# Patient Record
Sex: MALE | Race: BLACK OR AFRICAN AMERICAN | NOT HISPANIC OR LATINO | Employment: FULL TIME | URBAN - METROPOLITAN AREA
[De-identification: names, ages, dates, MRNs, and addresses within clinical notes are randomized per-mention and may not be internally consistent; named-entity substitution may affect disease eponyms.]

---

## 2017-09-11 ENCOUNTER — OFFICE VISIT (OUTPATIENT)
Dept: PODIATRY | Facility: CLINIC | Age: 42
End: 2017-09-11
Payer: COMMERCIAL

## 2017-09-11 ENCOUNTER — HOSPITAL ENCOUNTER (OUTPATIENT)
Dept: RADIOLOGY | Facility: HOSPITAL | Age: 42
Discharge: HOME | End: 2017-09-11
Attending: PODIATRIST
Payer: COMMERCIAL

## 2017-09-11 VITALS
DIASTOLIC BLOOD PRESSURE: 115 MMHG | HEART RATE: 87 BPM | SYSTOLIC BLOOD PRESSURE: 177 MMHG | WEIGHT: 237 LBS | HEIGHT: 74 IN | BODY MASS INDEX: 30.42 KG/M2

## 2017-09-11 DIAGNOSIS — M79.672 PAIN IN BOTH FEET: ICD-10-CM

## 2017-09-11 DIAGNOSIS — M79.672 PAIN IN BOTH FEET: Primary | ICD-10-CM

## 2017-09-11 DIAGNOSIS — M79.671 PAIN IN BOTH FEET: Primary | ICD-10-CM

## 2017-09-11 DIAGNOSIS — M79.671 PAIN IN BOTH FEET: ICD-10-CM

## 2017-09-11 DIAGNOSIS — M19.90 ARTHRITIS: ICD-10-CM

## 2017-09-11 PROCEDURE — 3008F BODY MASS INDEX DOCD: CPT | Mod: S$GLB,,, | Performed by: PODIATRIST

## 2017-09-11 PROCEDURE — 73610 X-RAY EXAM OF ANKLE: CPT | Mod: 50,TC

## 2017-09-11 PROCEDURE — 73630 X-RAY EXAM OF FOOT: CPT | Mod: 50,TC

## 2017-09-11 PROCEDURE — 73630 X-RAY EXAM OF FOOT: CPT | Mod: 26,50,, | Performed by: RADIOLOGY

## 2017-09-11 PROCEDURE — 99204 OFFICE O/P NEW MOD 45 MIN: CPT | Mod: S$GLB,,, | Performed by: PODIATRIST

## 2017-09-11 PROCEDURE — 73610 X-RAY EXAM OF ANKLE: CPT | Mod: 26,50,, | Performed by: RADIOLOGY

## 2017-09-11 PROCEDURE — 99999 PR PBB SHADOW E&M-EST. PATIENT-LVL III: CPT | Mod: PBBFAC,,, | Performed by: PODIATRIST

## 2017-09-11 RX ORDER — CELECOXIB 100 MG/1
100 CAPSULE ORAL 2 TIMES DAILY
Qty: 40 CAPSULE | Refills: 0 | Status: SHIPPED | OUTPATIENT
Start: 2017-09-11 | End: 2017-10-06 | Stop reason: SDUPTHER

## 2017-09-11 NOTE — PROGRESS NOTES
"CC:   bilateral rearfoot pain      HPI:   Ned Mcdermott is a 42 y.o. 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  Pain is worse with increased activity and weight bearing, localized to the rearfoot.  He reports a history of injury prior and was in a corrective boot, which seemed to change his gait and made pain worse.   He has tried soaks, frozen water bottle, ibuprofen at home for treatment.   On the weekends when he is not working, there is no pain.  He has been wearing a soft mesh athletic type shoe for the past month.  The work boots he wore prior were too painful to wear.   He has seen an outside podiatrist a few years ago.         No past medical history on file.      No current outpatient prescriptions on file prior to visit.     No current facility-administered medications on file prior to visit.            Review of patient's allergies indicates:  No Known Allergies        ROS:   General ROS: negative for - chills, fever or night sweats  Respiratory ROS: no cough, shortness of breath, or wheezing  Cardiovascular ROS: no chest pain or dyspnea on exertion  Musculoskeletal ROS: negative  Neurological ROS: no TIA or stroke symptoms  Dermatological ROS: negative      EXAM:     Vitals:    09/11/17 1437   BP: (!) 177/115   Pulse: 87   Weight: 107.5 kg (237 lb)   Height: 6' 2" (1.88 m)        General:  Alert, oriented, no acute distress      Bilateral  Lower extremity exam:    Vascular:   Dorsalis Pedis:  present   Posterior Tibial:  present  capillary refill time:  3 seconds  Temperature of toes warm to touch  Hair on toes:  present    Trace and non-pitting Edema to affected area.        Neurological:     sharp dull - B/L normal and light touch - B/L normal  No sensorimotor deficits      Dermatological:   There is intacg skin tone, turgor, and temperature.    Wounds: none  Erythema:  " none      Musculoskeletal:   Metatarsophalangeal, subtalar, and ankle range of motion are adequate ROM, adequate strength  Right foot: no gross deformity  Left foot: no gross deformity  Pain not reproducible        Imagin17 Bilateral feet 3 views.  There are hypertrophic degenerative change at the talonavicular joint bilaterally.  Calcaneal spurs noted bilaterally as well no acute fractures.  Impression degenerative change advanced for age as above..  Ankle 3 views bilateral   Mild DJD.  No fracture or dislocation.  No bone destruction identified            ASSESSMENT/PLAN:        I counseled the patient on his conditions, their implications and medical management.        Pain in both feet  -     X-Ray Ankle Complete Bilateral; Future; Expected date: 2017  -     X-Ray Foot Complete Bilateral; Future; Expected date: 2017    Arthritis    Other orders  -     celecoxib (CELEBREX) 100 MG capsule; Take 1 capsule (100 mg total) by mouth 2 (two) times daily.  Dispense: 40 capsule; Refill: 0    Xrays reviewed with the patient.   RICE.  Consider activity modification.  Call or return to clinic prn if these symptoms worsen or fail to improve as anticipated.

## 2017-09-28 ENCOUNTER — OFFICE VISIT (OUTPATIENT)
Dept: INTERNAL MEDICINE | Facility: CLINIC | Age: 42
End: 2017-09-28
Payer: COMMERCIAL

## 2017-09-28 VITALS
HEIGHT: 74 IN | HEART RATE: 84 BPM | SYSTOLIC BLOOD PRESSURE: 142 MMHG | BODY MASS INDEX: 30.53 KG/M2 | WEIGHT: 237.88 LBS | DIASTOLIC BLOOD PRESSURE: 102 MMHG | TEMPERATURE: 98 F | RESPIRATION RATE: 16 BRPM

## 2017-09-28 DIAGNOSIS — J30.89 CHRONIC NONSEASONAL ALLERGIC RHINITIS DUE TO OTHER ALLERGEN: ICD-10-CM

## 2017-09-28 DIAGNOSIS — G89.29 CHRONIC BILATERAL LOW BACK PAIN WITHOUT SCIATICA: ICD-10-CM

## 2017-09-28 DIAGNOSIS — M54.50 CHRONIC BILATERAL LOW BACK PAIN WITHOUT SCIATICA: ICD-10-CM

## 2017-09-28 DIAGNOSIS — K21.9 GASTROESOPHAGEAL REFLUX DISEASE WITHOUT ESOPHAGITIS: ICD-10-CM

## 2017-09-28 DIAGNOSIS — I10 ESSENTIAL HYPERTENSION: ICD-10-CM

## 2017-09-28 DIAGNOSIS — F17.200 TOBACCO DEPENDENCE: ICD-10-CM

## 2017-09-28 DIAGNOSIS — Z00.00 ANNUAL PHYSICAL EXAM: Primary | ICD-10-CM

## 2017-09-28 PROCEDURE — 99999 PR PBB SHADOW E&M-EST. PATIENT-LVL IV: CPT | Mod: PBBFAC,,, | Performed by: INTERNAL MEDICINE

## 2017-09-28 PROCEDURE — 99386 PREV VISIT NEW AGE 40-64: CPT | Mod: 25,S$GLB,, | Performed by: INTERNAL MEDICINE

## 2017-09-28 PROCEDURE — 90732 PPSV23 VACC 2 YRS+ SUBQ/IM: CPT | Mod: S$GLB,,, | Performed by: INTERNAL MEDICINE

## 2017-09-28 PROCEDURE — 90471 IMMUNIZATION ADMIN: CPT | Mod: S$GLB,,, | Performed by: INTERNAL MEDICINE

## 2017-09-28 RX ORDER — LOSARTAN POTASSIUM AND HYDROCHLOROTHIAZIDE 12.5; 1 MG/1; MG/1
TABLET ORAL
COMMUNITY
Start: 2017-09-26 | End: 2017-09-28 | Stop reason: CLARIF

## 2017-09-28 RX ORDER — AMLODIPINE BESYLATE 10 MG/1
10 TABLET ORAL DAILY
Qty: 30 TABLET | Refills: 11 | Status: SHIPPED | OUTPATIENT
Start: 2017-09-28 | End: 2017-11-09 | Stop reason: SDUPTHER

## 2017-09-28 RX ORDER — PANTOPRAZOLE SODIUM 40 MG/1
40 TABLET, DELAYED RELEASE ORAL
Qty: 30 TABLET | Refills: 11 | Status: SHIPPED | OUTPATIENT
Start: 2017-09-28 | End: 2017-11-09 | Stop reason: SDUPTHER
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
## 2017-10-06 RX ORDER — CELECOXIB 100 MG/1
100 CAPSULE ORAL 2 TIMES DAILY
Qty: 40 CAPSULE | Refills: 0 | Status: SHIPPED | OUTPATIENT
Start: 2017-10-06 | End: 2017-10-22 | Stop reason: SDUPTHER

## 2017-10-07 ENCOUNTER — LAB VISIT (OUTPATIENT)
Dept: LAB | Facility: HOSPITAL | Age: 42
End: 2017-10-07
Attending: INTERNAL MEDICINE
Payer: COMMERCIAL

## 2017-10-07 DIAGNOSIS — Z00.00 ANNUAL PHYSICAL EXAM: ICD-10-CM

## 2017-10-07 LAB
ALBUMIN SERPL BCP-MCNC: 3.7 G/DL
ALP SERPL-CCNC: 69 U/L
ALT SERPL W/O P-5'-P-CCNC: 59 U/L
ANION GAP SERPL CALC-SCNC: 12 MMOL/L
AST SERPL-CCNC: 36 U/L
BASOPHILS # BLD AUTO: 0.05 K/UL
BASOPHILS NFR BLD: 0.7 %
BILIRUB SERPL-MCNC: 0.3 MG/DL
BUN SERPL-MCNC: 18 MG/DL
CALCIUM SERPL-MCNC: 9.9 MG/DL
CHLORIDE SERPL-SCNC: 107 MMOL/L
CO2 SERPL-SCNC: 23 MMOL/L
CREAT SERPL-MCNC: 1.3 MG/DL
DIFFERENTIAL METHOD: NORMAL
EOSINOPHIL # BLD AUTO: 0.3 K/UL
EOSINOPHIL NFR BLD: 4.2 %
ERYTHROCYTE [DISTWIDTH] IN BLOOD BY AUTOMATED COUNT: 13.6 %
EST. GFR  (AFRICAN AMERICAN): >60 ML/MIN/1.73 M^2
EST. GFR  (NON AFRICAN AMERICAN): >60 ML/MIN/1.73 M^2
ESTIMATED AVG GLUCOSE: 120 MG/DL
GLUCOSE SERPL-MCNC: 104 MG/DL
HBA1C MFR BLD HPLC: 5.8 %
HCT VFR BLD AUTO: 42.7 %
HGB BLD-MCNC: 14.5 G/DL
LYMPHOCYTES # BLD AUTO: 2.1 K/UL
LYMPHOCYTES NFR BLD: 27 %
MCH RBC QN AUTO: 30 PG
MCHC RBC AUTO-ENTMCNC: 34 G/DL
MCV RBC AUTO: 88 FL
MONOCYTES # BLD AUTO: 0.9 K/UL
MONOCYTES NFR BLD: 11.5 %
NEUTROPHILS # BLD AUTO: 4.3 K/UL
NEUTROPHILS NFR BLD: 56.6 %
PLATELET # BLD AUTO: 266 K/UL
PMV BLD AUTO: 10.5 FL
POTASSIUM SERPL-SCNC: 4.4 MMOL/L
PROT SERPL-MCNC: 7.7 G/DL
RBC # BLD AUTO: 4.83 M/UL
SODIUM SERPL-SCNC: 142 MMOL/L
TSH SERPL DL<=0.005 MIU/L-ACNC: 0.84 UIU/ML
WBC # BLD AUTO: 7.63 K/UL

## 2017-10-07 PROCEDURE — 80053 COMPREHEN METABOLIC PANEL: CPT

## 2017-10-07 PROCEDURE — 84443 ASSAY THYROID STIM HORMONE: CPT

## 2017-10-07 PROCEDURE — 85025 COMPLETE CBC W/AUTO DIFF WBC: CPT

## 2017-10-07 PROCEDURE — 83036 HEMOGLOBIN GLYCOSYLATED A1C: CPT

## 2017-10-07 PROCEDURE — 36415 COLL VENOUS BLD VENIPUNCTURE: CPT | Mod: PO

## 2017-10-09 ENCOUNTER — PATIENT MESSAGE (OUTPATIENT)
Dept: INTERNAL MEDICINE | Facility: CLINIC | Age: 42
End: 2017-10-09

## 2017-10-09 DIAGNOSIS — R73.03 PRE-DIABETES: ICD-10-CM

## 2017-10-09 DIAGNOSIS — R74.01 ELEVATED ALT MEASUREMENT: ICD-10-CM

## 2017-10-10 NOTE — TELEPHONE ENCOUNTER
Informed the patient of test results and recommendations and mailed repeat blood work for 3 months to the patient.

## 2017-10-11 ENCOUNTER — OFFICE VISIT (OUTPATIENT)
Dept: PODIATRY | Facility: CLINIC | Age: 42
End: 2017-10-11
Payer: COMMERCIAL

## 2017-10-11 VITALS
SYSTOLIC BLOOD PRESSURE: 144 MMHG | WEIGHT: 241 LBS | DIASTOLIC BLOOD PRESSURE: 103 MMHG | HEART RATE: 84 BPM | BODY MASS INDEX: 30.93 KG/M2 | HEIGHT: 74 IN

## 2017-10-11 DIAGNOSIS — M79.672 PAIN IN BOTH FEET: ICD-10-CM

## 2017-10-11 DIAGNOSIS — M19.90 ARTHRITIS: Primary | ICD-10-CM

## 2017-10-11 DIAGNOSIS — M79.671 PAIN IN BOTH FEET: ICD-10-CM

## 2017-10-11 PROCEDURE — 99213 OFFICE O/P EST LOW 20 MIN: CPT | Mod: S$GLB,,, | Performed by: PODIATRIST

## 2017-10-11 PROCEDURE — 99999 PR PBB SHADOW E&M-EST. PATIENT-LVL III: CPT | Mod: PBBFAC,,, | Performed by: PODIATRIST

## 2017-10-11 NOTE — PROGRESS NOTES
CC:   bilateral rearfoot pain      HPI:   Ned Mcdermott is a 42 y.o. 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  Pain is worse with increased activity and weight bearing, localized to the rearfoot.  He reports a history of injury prior and was in a corrective boot, which seemed to change his gait and made pain worse.   He has tried soaks, frozen water bottle, ibuprofen at home for treatment.   On the weekends when he is not working, there is no pain.  He has been wearing a soft mesh athletic type shoe for the past month.  The work boots he wore prior were too painful to wear.   He has seen an outside podiatrist a few years ago.         10/11/17:  follow up foot pain.  Reports improvement with Celebrex and insoles.  Otherwise, feet do get achy if walking a lot.  Unable to modify activities much.         Past Medical History:   Diagnosis Date    Allergy     Hypertension          Current Outpatient Prescriptions on File Prior to Visit   Medication Sig Dispense Refill    amlodipine (NORVASC) 10 MG tablet Take 1 tablet (10 mg total) by mouth once daily. 30 tablet 11    celecoxib (CELEBREX) 100 MG capsule TAKE 1 CAPSULE (100 MG TOTAL) BY MOUTH 2 (TWO) TIMES DAILY. 40 capsule 0    pantoprazole (PROTONIX) 40 MG tablet Take 1 tablet (40 mg total) by mouth before breakfast. 30 tablet 11     No current facility-administered medications on file prior to visit.            Review of patient's allergies indicates:  No Known Allergies        ROS:   General ROS: negative for - chills, fever or night sweats  Respiratory ROS: no cough, shortness of breath, or wheezing  Cardiovascular ROS: no chest pain or dyspnea on exertion  Musculoskeletal ROS: negative  Neurological ROS: no TIA or stroke symptoms  Dermatological ROS: negative      EXAM:     Vitals:    10/11/17 1554   BP: (!) 144/103   Pulse: 84  "  Weight: 109.3 kg (241 lb)   Height: 6' 2" (1.88 m)        General:  Alert, oriented, no acute distress      Bilateral  Lower extremity exam:    Vascular:   Dorsalis Pedis:  present   Posterior Tibial:  present  capillary refill time:  3 seconds  Temperature of toes warm to touch  Hair on toes:  present    No edema      Neurological:     sharp dull - B/L normal and light touch - B/L normal  No sensorimotor deficits      Dermatological:   There is intacg skin tone, turgor, and temperature.    Wounds: none  Erythema:  none      Musculoskeletal:   Metatarsophalangeal, subtalar, and ankle range of motion are adequate ROM, adequate strength  Right foot: no gross deformity  Left foot: no gross deformity  Pain not reproducible        Imagin17 Bilateral feet 3 views.  There are hypertrophic degenerative change at the talonavicular joint bilaterally.  Calcaneal spurs noted bilaterally as well no acute fractures.  Impression degenerative change advanced for age as above..  Ankle 3 views bilateral   Mild DJD.  No fracture or dislocation.  No bone destruction identified            ASSESSMENT/PLAN:        I counseled the patient on his conditions, their implications and medical management.        Arthritis    Pain in both feet    ·   RICE  · NSAIDS prn   · Consider activity modification.     · PT if no improvement  "

## 2017-10-19 ENCOUNTER — TELEPHONE (OUTPATIENT)
Dept: SMOKING CESSATION | Facility: CLINIC | Age: 42
End: 2017-10-19

## 2017-10-19 NOTE — TELEPHONE ENCOUNTER
Successful contact with patient regarding missed tobacco cessation intake appointment. Pt states, He's not ready to schedule at this time due to his work schedule. Contact information provided to schedule when ready.

## 2017-10-22 RX ORDER — CELECOXIB 100 MG/1
100 CAPSULE ORAL 2 TIMES DAILY
Qty: 40 CAPSULE | Refills: 0 | Status: SHIPPED | OUTPATIENT
Start: 2017-10-22 | End: 2017-11-03 | Stop reason: SDUPTHER

## 2017-10-26 ENCOUNTER — PATIENT MESSAGE (OUTPATIENT)
Dept: PODIATRY | Facility: CLINIC | Age: 42
End: 2017-10-26

## 2017-10-31 ENCOUNTER — OFFICE VISIT (OUTPATIENT)
Dept: PODIATRY | Facility: CLINIC | Age: 42
End: 2017-10-31
Payer: COMMERCIAL

## 2017-10-31 VITALS
HEIGHT: 74 IN | BODY MASS INDEX: 30.93 KG/M2 | HEART RATE: 80 BPM | DIASTOLIC BLOOD PRESSURE: 90 MMHG | SYSTOLIC BLOOD PRESSURE: 140 MMHG | WEIGHT: 241 LBS

## 2017-10-31 DIAGNOSIS — M19.90 ARTHRITIS: Primary | ICD-10-CM

## 2017-10-31 DIAGNOSIS — M79.671 PAIN IN BOTH FEET: ICD-10-CM

## 2017-10-31 DIAGNOSIS — M79.672 PAIN IN BOTH FEET: ICD-10-CM

## 2017-10-31 PROCEDURE — 99499 UNLISTED E&M SERVICE: CPT | Mod: S$GLB,,, | Performed by: PODIATRIST

## 2017-10-31 PROCEDURE — 20605 DRAIN/INJ JOINT/BURSA W/O US: CPT | Mod: RT,S$GLB,, | Performed by: PODIATRIST

## 2017-10-31 PROCEDURE — 99999 PR PBB SHADOW E&M-EST. PATIENT-LVL III: CPT | Mod: PBBFAC,,, | Performed by: PODIATRIST

## 2017-10-31 RX ORDER — DEXAMETHASONE SODIUM PHOSPHATE 4 MG/ML
4 INJECTION, SOLUTION INTRA-ARTICULAR; INTRALESIONAL; INTRAMUSCULAR; INTRAVENOUS; SOFT TISSUE ONCE
Status: COMPLETED | OUTPATIENT
Start: 2017-10-31 | End: 2017-10-31

## 2017-10-31 RX ORDER — TRIAMCINOLONE ACETONIDE 40 MG/ML
40 INJECTION, SUSPENSION INTRA-ARTICULAR; INTRAMUSCULAR ONCE
Status: COMPLETED | OUTPATIENT
Start: 2017-10-31 | End: 2017-10-31

## 2017-10-31 RX ADMIN — TRIAMCINOLONE ACETONIDE 40 MG: 40 INJECTION, SUSPENSION INTRA-ARTICULAR; INTRAMUSCULAR at 03:10

## 2017-10-31 RX ADMIN — DEXAMETHASONE SODIUM PHOSPHATE 4 MG: 4 INJECTION, SOLUTION INTRA-ARTICULAR; INTRALESIONAL; INTRAMUSCULAR; INTRAVENOUS; SOFT TISSUE at 03:10

## 2017-10-31 NOTE — PROGRESS NOTES
CC:   bilateral rearfoot pain      HPI:   Ned Mcdermott is a 42 y.o. 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  Pain is worse with increased activity and weight bearing, localized to the rearfoot.  He reports a history of injury prior and was in a corrective boot, which seemed to change his gait and made pain worse.   He has tried soaks, frozen water bottle, ibuprofen at home for treatment.   On the weekends when he is not working, there is no pain.  He has been wearing a soft mesh athletic type shoe for the past month.  The work boots he wore prior were too painful to wear.   He has seen an outside podiatrist a few years ago.     10/11/17:  follow up foot pain.  Reports improvement with Celebrex and insoles.  Otherwise, feet do get achy if walking a lot.  Unable to modify activities much.       10/31/17:  follow up foot pain.  Has insoles.  Pain relief also with celebrex.  He is requesting an injection today.           Past Medical History:   Diagnosis Date    Allergy     Hypertension          Current Outpatient Prescriptions on File Prior to Visit   Medication Sig Dispense Refill    amlodipine (NORVASC) 10 MG tablet Take 1 tablet (10 mg total) by mouth once daily. 30 tablet 11    celecoxib (CELEBREX) 100 MG capsule TAKE 1 CAPSULE (100 MG TOTAL) BY MOUTH 2 (TWO) TIMES DAILY. 40 capsule 0    pantoprazole (PROTONIX) 40 MG tablet Take 1 tablet (40 mg total) by mouth before breakfast. 30 tablet 11     No current facility-administered medications on file prior to visit.            Review of patient's allergies indicates:  No Known Allergies        ROS:   General ROS: negative for - chills, fever or night sweats  Respiratory ROS: no cough, shortness of breath, or wheezing  Cardiovascular ROS: no chest pain or dyspnea on exertion  Musculoskeletal ROS: negative  Neurological ROS: no TIA or  "stroke symptoms  Dermatological ROS: negative      EXAM:     Vitals:    10/31/17 1454   BP: (!) 140/90   Pulse: 80   Weight: 109.3 kg (241 lb)   Height: 6' 2" (1.88 m)        General:  Alert, oriented, no acute distress      RIGHT Lower extremity exam:    Vascular:   Dorsalis Pedis:  present   Posterior Tibial:  present  capillary refill time:  3 seconds  Temperature of toes warm to touch  Hair on toes:  present    Trace edema to dorsal foot.       Neurological:     sharp dull - B/L normal and light touch - B/L normal  No sensorimotor deficits      Dermatological:   There is intacg skin tone, turgor, and temperature.    Wounds: none  Erythema:  none      Musculoskeletal:   Metatarsophalangeal, subtalar, and ankle range of motion are adequate ROM, adequate strength  Right foot: no gross deformity  Left foot: no gross deformity  Pain not reproducible        Imagin17 Bilateral feet 3 views.  There are hypertrophic degenerative change at the talonavicular joint bilaterally.  Calcaneal spurs noted bilaterally as well no acute fractures.  Impression degenerative change advanced for age as above..  Ankle 3 views bilateral   Mild DJD.  No fracture or dislocation.  No bone destruction identified            ASSESSMENT/PLAN:        I counseled the patient on his conditions, their implications and medical management.      Arthritis - Right Foot  -     dexamethasone injection 4 mg; Inject 1 mL (4 mg total) into the muscle once.  -     triamcinolone acetonide injection 40 mg; Inject 1 mL (40 mg total) into the muscle once.    Pain in both feet  -     dexamethasone injection 4 mg; Inject 1 mL (4 mg total) into the muscle once.  -     triamcinolone acetonide injection 40 mg; Inject 1 mL (40 mg total) into the muscle once.          · RICE  · NSAIDS prn   (Celebrex, he got a refill already)  · Consider activity modification.     · PT if no improvement  · With patient's permission,  A steroid injection was performed at right " midfoot using 1% plain Lidocaine and 4 mg of decadron and Kenalog-40. This was well tolerated.    Call or return to clinic prn if these symptoms worsen or fail to improve as anticipated.

## 2017-11-05 RX ORDER — CELECOXIB 100 MG/1
100 CAPSULE ORAL 2 TIMES DAILY
Qty: 40 CAPSULE | Refills: 0 | Status: SHIPPED | OUTPATIENT
Start: 2017-11-05 | End: 2017-11-22 | Stop reason: SDUPTHER

## 2017-11-09 ENCOUNTER — OFFICE VISIT (OUTPATIENT)
Dept: INTERNAL MEDICINE | Facility: CLINIC | Age: 42
End: 2017-11-09
Payer: COMMERCIAL

## 2017-11-09 VITALS
WEIGHT: 244.69 LBS | SYSTOLIC BLOOD PRESSURE: 164 MMHG | HEART RATE: 95 BPM | RESPIRATION RATE: 16 BRPM | DIASTOLIC BLOOD PRESSURE: 100 MMHG | HEIGHT: 75 IN | BODY MASS INDEX: 30.42 KG/M2 | OXYGEN SATURATION: 98 % | TEMPERATURE: 99 F

## 2017-11-09 DIAGNOSIS — I10 ESSENTIAL HYPERTENSION: Primary | ICD-10-CM

## 2017-11-09 DIAGNOSIS — R11.10 VOMITING, INTRACTABILITY OF VOMITING NOT SPECIFIED, PRESENCE OF NAUSEA NOT SPECIFIED, UNSPECIFIED VOMITING TYPE: ICD-10-CM

## 2017-11-09 DIAGNOSIS — K21.9 GASTROESOPHAGEAL REFLUX DISEASE WITHOUT ESOPHAGITIS: ICD-10-CM

## 2017-11-09 DIAGNOSIS — R73.03 PRE-DIABETES: ICD-10-CM

## 2017-11-09 DIAGNOSIS — K21.9 GASTROESOPHAGEAL REFLUX DISEASE, ESOPHAGITIS PRESENCE NOT SPECIFIED: ICD-10-CM

## 2017-11-09 PROCEDURE — 99999 PR PBB SHADOW E&M-EST. PATIENT-LVL III: CPT | Mod: PBBFAC,,, | Performed by: INTERNAL MEDICINE

## 2017-11-09 PROCEDURE — 99213 OFFICE O/P EST LOW 20 MIN: CPT | Mod: S$GLB,,, | Performed by: INTERNAL MEDICINE

## 2017-11-09 RX ORDER — PANTOPRAZOLE SODIUM 40 MG/1
40 TABLET, DELAYED RELEASE ORAL
Qty: 90 TABLET | Refills: 3 | Status: SHIPPED | OUTPATIENT
Start: 2017-11-09 | End: 2018-09-25 | Stop reason: SDUPTHER

## 2017-11-09 RX ORDER — AMLODIPINE BESYLATE 10 MG/1
10 TABLET ORAL DAILY
Qty: 90 TABLET | Refills: 3 | Status: SHIPPED | OUTPATIENT
Start: 2017-11-09 | End: 2018-10-27 | Stop reason: SDUPTHER

## 2017-11-09 NOTE — PROGRESS NOTES
Subjective:       Patient ID: Ned Mcdermott is a 42 y.o. male.    Chief Complaint: Follow-up (6 week follow up) and Medication Refill (protonix)    HPI     42 y.o. male presents for follow up of chronic medical problems including HTN, GERD    HTN - Patient is currently on amlodipine 10mg daily. He does not check his BP at home. Side effects of medications note: none. Denies blurred vision, dizziness, chest pain, shortness of breath, nausea. Ran out of amlodipine and pharmacy denied refill for whatever reason so he resumed hyzaar. Resumed amlodipine over the past few days.    Noctunral emesis - improved since starting protonix. Ran out of protonix and had two occurrences. No longer eating at bed time. He is agreeable to seeing GI for an EGD.    Review of Systems   Constitutional: Negative for fatigue and unexpected weight change.   HENT: Negative for dental problem, sinus pain and trouble swallowing.    Eyes: Negative for discharge and redness.   Respiratory: Negative for cough and shortness of breath.    Cardiovascular: Negative for chest pain and leg swelling.   Gastrointestinal: Positive for vomiting. Negative for abdominal pain, blood in stool, constipation and diarrhea.   Genitourinary: Negative for difficulty urinating, dysuria and frequency.   Musculoskeletal: Positive for arthralgias (feet). Negative for gait problem and joint swelling.   Skin: Negative for pallor, rash and wound.   Neurological: Positive for headaches (occasional). Negative for numbness.       Objective:        Vitals:    17 1524   BP: (!) 164/100   Pulse: 95   Resp: 16   Temp: 98.6 °F (37 °C)   Repeat blood pressure taken by examiner after pt restin/100    Body mass index is 30.59 kg/m².    Physical Exam   Constitutional: He is oriented to person, place, and time. He appears well-developed and well-nourished. No distress.   HENT:   Head: Normocephalic and atraumatic.   Right Ear: External ear normal.   Left Ear: External  ear normal.   Nose: Nose normal.   Mouth/Throat: Oropharynx is clear and moist.   Eyes: Conjunctivae and EOM are normal. Right eye exhibits no discharge. Left eye exhibits no discharge.   Neck: Normal range of motion. Neck supple.   Cardiovascular: Normal rate, regular rhythm, normal heart sounds and intact distal pulses.    Pulmonary/Chest: Effort normal and breath sounds normal.   Abdominal: Soft. Bowel sounds are normal.   Musculoskeletal: Normal range of motion. He exhibits no edema.   Lymphadenopathy:     He has no cervical adenopathy.   Neurological: He is alert and oriented to person, place, and time.   Skin: Skin is warm and dry. He is not diaphoretic. No erythema.   Psychiatric: He has a normal mood and affect. His behavior is normal. Thought content normal.       Assessment:     1. Essential hypertension    2. Pre-diabetes    3. Gastroesophageal reflux disease, esophagitis presence not specified    4. Gastroesophageal reflux disease without esophagitis    5. Vomiting, intractability of vomiting not specified, presence of nausea not specified, unspecified vomiting type           Plan:         1. Essential hypertension  - refilled amlodipine 10mg daily.   - instructed pt to fill out BP log and f/u 4 wks. He will notify me if BP consistently >140/90 and will bring back to clinic earlier and likely add back hyzaar  - Lipid panel; Future    2. Pre-diabetes  - Patient educated on importance of diet and exercise.  Recommended 30-45 minutes of exercise five days a week.  In addition, counseled patient on importance of low fat diet.  Limit carbohydrate intake.  Increase protein intake and vegetables.   - Lipid panel; Future    3. Gastroesophageal reflux disease, esophagitis presence not specified  - prontinx refilled  - Ambulatory Referral to Gastroenterology- suspect he needs EGD given nocturnal emesis    4. Gastroesophageal reflux disease without esophagitis  - pantoprazole (PROTONIX) 40 MG tablet; Take 1 tablet  (40 mg total) by mouth before breakfast.  Dispense: 90 tablet; Refill: 3    5. Vomiting, intractability of vomiting not specified, presence of nausea not specified, unspecified vomiting type  - refer to GI for EGD  - Ambulatory Referral to Gastroenterology    RTC 4 weeks with BP log

## 2017-11-10 ENCOUNTER — PATIENT MESSAGE (OUTPATIENT)
Dept: INTERNAL MEDICINE | Facility: CLINIC | Age: 42
End: 2017-11-10

## 2017-11-22 RX ORDER — CELECOXIB 100 MG/1
100 CAPSULE ORAL 2 TIMES DAILY
Qty: 40 CAPSULE | Refills: 0 | Status: SHIPPED | OUTPATIENT
Start: 2017-11-22 | End: 2017-12-08 | Stop reason: SDUPTHER

## 2017-12-08 RX ORDER — CELECOXIB 100 MG/1
100 CAPSULE ORAL 2 TIMES DAILY
Qty: 40 CAPSULE | Refills: 0 | Status: SHIPPED | OUTPATIENT
Start: 2017-12-08 | End: 2017-12-24 | Stop reason: SDUPTHER

## 2017-12-26 RX ORDER — CELECOXIB 100 MG/1
100 CAPSULE ORAL 2 TIMES DAILY
Qty: 40 CAPSULE | Refills: 0 | Status: SHIPPED | OUTPATIENT
Start: 2017-12-26 | End: 2018-01-11 | Stop reason: SDUPTHER

## 2018-01-11 RX ORDER — CELECOXIB 100 MG/1
100 CAPSULE ORAL 2 TIMES DAILY
Qty: 40 CAPSULE | Refills: 0 | Status: SHIPPED | OUTPATIENT
Start: 2018-01-11 | End: 2018-01-28 | Stop reason: SDUPTHER

## 2018-01-13 ENCOUNTER — LAB VISIT (OUTPATIENT)
Dept: LAB | Facility: HOSPITAL | Age: 43
End: 2018-01-13
Attending: INTERNAL MEDICINE
Payer: COMMERCIAL

## 2018-01-13 DIAGNOSIS — I10 ESSENTIAL HYPERTENSION: ICD-10-CM

## 2018-01-13 DIAGNOSIS — R73.03 PRE-DIABETES: ICD-10-CM

## 2018-01-13 DIAGNOSIS — R74.01 ELEVATED ALT MEASUREMENT: ICD-10-CM

## 2018-01-13 LAB
ALBUMIN SERPL BCP-MCNC: 3.9 G/DL
ALP SERPL-CCNC: 63 U/L
ALT SERPL W/O P-5'-P-CCNC: 46 U/L
ANION GAP SERPL CALC-SCNC: 8 MMOL/L
AST SERPL-CCNC: 32 U/L
BILIRUB SERPL-MCNC: 0.7 MG/DL
BUN SERPL-MCNC: 18 MG/DL
CALCIUM SERPL-MCNC: 9 MG/DL
CHLORIDE SERPL-SCNC: 104 MMOL/L
CHOLEST SERPL-MCNC: 159 MG/DL
CHOLEST/HDLC SERPL: 4.2 {RATIO}
CO2 SERPL-SCNC: 27 MMOL/L
CREAT SERPL-MCNC: 1.2 MG/DL
EST. GFR  (AFRICAN AMERICAN): >60 ML/MIN/1.73 M^2
EST. GFR  (NON AFRICAN AMERICAN): >60 ML/MIN/1.73 M^2
ESTIMATED AVG GLUCOSE: 117 MG/DL
GLUCOSE SERPL-MCNC: 108 MG/DL
HBA1C MFR BLD HPLC: 5.7 %
HDLC SERPL-MCNC: 38 MG/DL
HDLC SERPL: 23.9 %
LDLC SERPL CALC-MCNC: 108.6 MG/DL
NONHDLC SERPL-MCNC: 121 MG/DL
POTASSIUM SERPL-SCNC: 4.3 MMOL/L
PROT SERPL-MCNC: 7.8 G/DL
SODIUM SERPL-SCNC: 139 MMOL/L
TRIGL SERPL-MCNC: 62 MG/DL

## 2018-01-13 PROCEDURE — 36415 COLL VENOUS BLD VENIPUNCTURE: CPT | Mod: PO

## 2018-01-13 PROCEDURE — 83036 HEMOGLOBIN GLYCOSYLATED A1C: CPT

## 2018-01-13 PROCEDURE — 80053 COMPREHEN METABOLIC PANEL: CPT

## 2018-01-13 PROCEDURE — 80061 LIPID PANEL: CPT

## 2018-01-15 ENCOUNTER — PATIENT MESSAGE (OUTPATIENT)
Dept: INTERNAL MEDICINE | Facility: CLINIC | Age: 43
End: 2018-01-15

## 2018-01-28 RX ORDER — CELECOXIB 100 MG/1
100 CAPSULE ORAL 2 TIMES DAILY
Qty: 40 CAPSULE | Refills: 0 | Status: SHIPPED | OUTPATIENT
Start: 2018-01-28 | End: 2018-02-14 | Stop reason: SDUPTHER

## 2018-02-14 RX ORDER — CELECOXIB 100 MG/1
100 CAPSULE ORAL 2 TIMES DAILY
Qty: 40 CAPSULE | Refills: 3 | Status: SHIPPED | OUTPATIENT
Start: 2018-02-14 | End: 2018-04-17

## 2018-03-19 ENCOUNTER — OFFICE VISIT (OUTPATIENT)
Dept: INTERNAL MEDICINE | Facility: CLINIC | Age: 43
End: 2018-03-19
Payer: COMMERCIAL

## 2018-03-19 VITALS
BODY MASS INDEX: 32.99 KG/M2 | HEART RATE: 72 BPM | HEIGHT: 74 IN | TEMPERATURE: 99 F | SYSTOLIC BLOOD PRESSURE: 142 MMHG | WEIGHT: 257.06 LBS | DIASTOLIC BLOOD PRESSURE: 90 MMHG

## 2018-03-19 DIAGNOSIS — R35.1 NOCTURIA: ICD-10-CM

## 2018-03-19 DIAGNOSIS — B96.89 ACUTE BACTERIAL SINUSITIS: Primary | ICD-10-CM

## 2018-03-19 DIAGNOSIS — J01.90 ACUTE BACTERIAL SINUSITIS: Primary | ICD-10-CM

## 2018-03-19 PROCEDURE — 3080F DIAST BP >= 90 MM HG: CPT | Mod: CPTII,S$GLB,, | Performed by: INTERNAL MEDICINE

## 2018-03-19 PROCEDURE — 3077F SYST BP >= 140 MM HG: CPT | Mod: CPTII,S$GLB,, | Performed by: INTERNAL MEDICINE

## 2018-03-19 PROCEDURE — 99214 OFFICE O/P EST MOD 30 MIN: CPT | Mod: 25,S$GLB,, | Performed by: INTERNAL MEDICINE

## 2018-03-19 PROCEDURE — 99999 PR PBB SHADOW E&M-EST. PATIENT-LVL III: CPT | Mod: PBBFAC,,, | Performed by: INTERNAL MEDICINE

## 2018-03-19 PROCEDURE — 96372 THER/PROPH/DIAG INJ SC/IM: CPT | Mod: 59,S$GLB,, | Performed by: INTERNAL MEDICINE

## 2018-03-19 RX ORDER — AMOXICILLIN AND CLAVULANATE POTASSIUM 875; 125 MG/1; MG/1
1 TABLET, FILM COATED ORAL 2 TIMES DAILY
Qty: 10 TABLET | Refills: 0 | Status: SHIPPED | OUTPATIENT
Start: 2018-03-19 | End: 2018-03-24

## 2018-03-19 RX ORDER — FLUTICASONE PROPIONATE 50 MCG
2 SPRAY, SUSPENSION (ML) NASAL DAILY
Qty: 16 G | Refills: 12 | Status: SHIPPED | OUTPATIENT
Start: 2018-03-19 | End: 2018-04-18

## 2018-03-19 RX ORDER — TAMSULOSIN HYDROCHLORIDE 0.4 MG/1
0.4 CAPSULE ORAL DAILY
Qty: 30 CAPSULE | Refills: 11 | Status: SHIPPED | OUTPATIENT
Start: 2018-03-19 | End: 2019-02-16 | Stop reason: SDUPTHER

## 2018-03-19 RX ORDER — TAMSULOSIN HYDROCHLORIDE 0.4 MG/1
0.4 CAPSULE ORAL DAILY
Status: DISCONTINUED | OUTPATIENT
Start: 2018-03-19 | End: 2018-03-19

## 2018-03-19 RX ORDER — TRIAMCINOLONE ACETONIDE 40 MG/ML
40 INJECTION, SUSPENSION INTRA-ARTICULAR; INTRAMUSCULAR
Status: COMPLETED | OUTPATIENT
Start: 2018-03-19 | End: 2018-03-19

## 2018-03-19 RX ADMIN — TRIAMCINOLONE ACETONIDE 40 MG: 40 INJECTION, SUSPENSION INTRA-ARTICULAR; INTRAMUSCULAR at 12:03

## 2018-03-19 NOTE — PROGRESS NOTES
"Subjective:       Patient ID: Ned Mcdermott is a 43 y.o. male.    Chief Complaint: Sinus Problem; Fatigue; Headache; Generalized Body Aches; and Cough    HPI    Pt reports sinus pressure, congestion for the past 1.5 - 2 weeks. He is now having sinus pain, which limits his ability to work. He is having cough, productive of some sputum. He reports a constant post-nasal drip. Sore throat last week, but now better.  He takes xyzal every morning. He has added sudafed for the congestion and pain.       Nocturia - at least three times nightly. He feels as though he is emptying his bladder. He has daytime urgency. He does report occasional LE (around ankle) edema.     Review of Systems   Constitutional: Negative for chills and fever.   HENT: Positive for congestion, postnasal drip and sinus pain. Negative for rhinorrhea and sore throat.    Eyes: Negative for discharge and redness.   Respiratory: Positive for cough. Negative for shortness of breath.    Cardiovascular: Negative for chest pain.   Gastrointestinal: Negative for diarrhea, nausea and vomiting.   Genitourinary: Positive for urgency.        + nocturia   Musculoskeletal: Negative for arthralgias and myalgias.   Skin: Negative for rash.   Allergic/Immunologic: Negative for immunocompromised state.   Neurological: Positive for headaches.   Hematological: Negative for adenopathy.       Objective:        Vitals:    03/19/18 1141 03/19/18 1152   BP: (!) 144/96 (!) 142/90   BP Location: Right arm    Patient Position: Sitting    BP Method: Medium (Manual)    Pulse: 72    Temp: 98.5 °F (36.9 °C)    TempSrc: Oral    Weight: 116.6 kg (257 lb 0.9 oz)    Height: 6' 2" (1.88 m)        Body mass index is 33 kg/m².    Physical Exam   Constitutional: He is oriented to person, place, and time. He appears well-developed and well-nourished. No distress.   HENT:   Head: Normocephalic and atraumatic.   Nose: Mucosal edema present. Right sinus exhibits no maxillary sinus tenderness " and no frontal sinus tenderness. Left sinus exhibits no maxillary sinus tenderness and no frontal sinus tenderness.   Cobblestoning of posterior oropharynx     Eyes: Conjunctivae and EOM are normal. Right eye exhibits no discharge. Left eye exhibits no discharge.   Neck: Normal range of motion. Neck supple.   Cardiovascular: Normal rate, regular rhythm, normal heart sounds and intact distal pulses.    Pulmonary/Chest: Effort normal and breath sounds normal.   Abdominal: Soft. Bowel sounds are normal.   Musculoskeletal: Normal range of motion. He exhibits no edema.   Neurological: He is alert and oriented to person, place, and time.   Skin: Skin is warm and dry. He is not diaphoretic. No erythema.   Psychiatric: He has a normal mood and affect. His behavior is normal. Thought content normal.       Assessment:     1. Acute bacterial sinusitis    2. Nocturia           Plan:         1. Acute bacterial sinusitis  - add nasal saline rinse and flonase, Continue xyzal.  - fluticasone (FLONASE) 50 mcg/actuation nasal spray; 2 sprays (100 mcg total) by Each Nare route once daily.  Dispense: 16 g; Refill: 12  - triamcinolone acetonide injection 40 mg; Inject 1 mL (40 mg total) into the muscle one time.  - amoxicillin-clavulanate 875-125mg (AUGMENTIN) 875-125 mg per tablet; Take 1 tablet by mouth 2 (two) times daily.  Dispense: 10 tablet; Refill: 0    2. Nocturia  - trial tx for BPH and monitor for improvement. If no improvement, consider adding HCTZ during day to improve mild LE edema as that may cause nocturia once supine at night.   - tamsulosin (FLOMAX) 0.4 mg Cp24; Take 1 capsule (0.4 mg total) by mouth once daily.  Dispense: 30 capsule; Refill: 11      rtc 2 mo for f/u HTN, nocturia

## 2018-03-19 NOTE — PATIENT INSTRUCTIONS
Take an antihistamine daily (allegra/ zyrtec/ xyzal/ or claritin). You may also take a benadryl at night if symptoms are uncontrolled or affecting sleep- only do this for a few nights. Generic medications are okay.  Use a nasal steroid spray such as Flonase or Nasacort daily.   Continue this regimen for at least 2 weeks. Expect symptoms to last 7-10 days.   Nasal saline rinse or spray (Simply Saline/ Ocean spray/ Nasal Mist) will help with congestion. Only use sterile water if using a neti-pot. If using the spray, lean head back and spray each nostril for 2-3 seconds - you should taste salt water in back of throat. Use the spray twice daily until symptoms resolve.   Avoid decongestant medications if you have hypertension, heart disease or abnormal heart rhythm.   Dextromethorphan (DM, delsym) will help to suppress coughing. Do night drive if drowsy.  Warm salt water gargles to alleviate sore throat or lozenges or chloraseptic spray.  NSAIDs (aleve, advil) and tylenol can help with pain, aches and fever.  Make sure to stay well hydrated.   To avoid spreading symptoms: wash hands or use hand  frequently. Cover face when coughing. Do not share utensils, etc.

## 2018-03-26 ENCOUNTER — PATIENT MESSAGE (OUTPATIENT)
Dept: INTERNAL MEDICINE | Facility: CLINIC | Age: 43
End: 2018-03-26

## 2018-03-26 DIAGNOSIS — I10 ESSENTIAL HYPERTENSION: Primary | ICD-10-CM

## 2018-03-26 RX ORDER — LOSARTAN POTASSIUM AND HYDROCHLOROTHIAZIDE 12.5; 5 MG/1; MG/1
1 TABLET ORAL DAILY
Qty: 30 TABLET | Refills: 1 | Status: SHIPPED | OUTPATIENT
Start: 2018-03-26 | End: 2018-04-11 | Stop reason: DRUGHIGH

## 2018-04-11 ENCOUNTER — OFFICE VISIT (OUTPATIENT)
Dept: INTERNAL MEDICINE | Facility: CLINIC | Age: 43
End: 2018-04-11
Payer: COMMERCIAL

## 2018-04-11 VITALS
SYSTOLIC BLOOD PRESSURE: 140 MMHG | WEIGHT: 257.94 LBS | TEMPERATURE: 98 F | RESPIRATION RATE: 18 BRPM | DIASTOLIC BLOOD PRESSURE: 76 MMHG | BODY MASS INDEX: 32.07 KG/M2 | HEIGHT: 75 IN | HEART RATE: 84 BPM

## 2018-04-11 DIAGNOSIS — I10 ESSENTIAL HYPERTENSION: Primary | ICD-10-CM

## 2018-04-11 DIAGNOSIS — M19.071 PRIMARY OSTEOARTHRITIS OF BOTH FEET: ICD-10-CM

## 2018-04-11 DIAGNOSIS — N40.1 BENIGN PROSTATIC HYPERPLASIA WITH NOCTURIA: ICD-10-CM

## 2018-04-11 DIAGNOSIS — M19.072 PRIMARY OSTEOARTHRITIS OF BOTH FEET: ICD-10-CM

## 2018-04-11 DIAGNOSIS — R35.1 BENIGN PROSTATIC HYPERPLASIA WITH NOCTURIA: ICD-10-CM

## 2018-04-11 PROCEDURE — 3078F DIAST BP <80 MM HG: CPT | Mod: CPTII,S$GLB,, | Performed by: INTERNAL MEDICINE

## 2018-04-11 PROCEDURE — 3077F SYST BP >= 140 MM HG: CPT | Mod: CPTII,S$GLB,, | Performed by: INTERNAL MEDICINE

## 2018-04-11 PROCEDURE — 99214 OFFICE O/P EST MOD 30 MIN: CPT | Mod: S$GLB,,, | Performed by: INTERNAL MEDICINE

## 2018-04-11 PROCEDURE — 99999 PR PBB SHADOW E&M-EST. PATIENT-LVL III: CPT | Mod: PBBFAC,,, | Performed by: INTERNAL MEDICINE

## 2018-04-11 RX ORDER — LOSARTAN POTASSIUM AND HYDROCHLOROTHIAZIDE 25; 100 MG/1; MG/1
1 TABLET ORAL DAILY
Qty: 30 TABLET | Refills: 11 | Status: SHIPPED | OUTPATIENT
Start: 2018-04-11 | End: 2019-03-16 | Stop reason: SDUPTHER

## 2018-04-11 NOTE — PATIENT INSTRUCTIONS
Continue to monitor BP at home - send BP log to Dr. Rudd in 4 weeks    Double the losartan-HCTZ until you run out of current pills then  new prescription and take one pill daily (the dosage is increased on new prescription)

## 2018-04-11 NOTE — PROGRESS NOTES
"Subjective:       Patient ID: Ned Mcdermott is a 43 y.o. male.    Chief Complaint: Follow-up (BP) and Foot Pain (both)    HPI    43 y.o. male presents for follow up of chronic medical problems including HTN, BPH and new bilateral foot pain.    HTN - Patient is currently on amlodipine 10mg daily and losartan-HCTZ 50-12.5mg. He does check his BP at home, and it runs 130s-140s/80s-90s since starting the Hyzaar. Side effects of medications note: none. He denies headaches, blurred vision, dizziness, chest pain, shortness of breath, nausea.    BPH started on flomax. Improved nocturia - now only waking up once at night rather than 3-4x.     Bilateral pain in both feet - worsens with walking, and is on his feet a majority of the day due to his job. He did see podiatry on 10/31/2017. He received a steroid injection at that time with significant improvement.     Review of Systems   Constitutional: Negative for activity change and unexpected weight change.   HENT: Negative for hearing loss, rhinorrhea and trouble swallowing.    Eyes: Negative for discharge and visual disturbance.   Respiratory: Negative for chest tightness and wheezing.    Cardiovascular: Negative for chest pain and palpitations.   Gastrointestinal: Negative for blood in stool, constipation, diarrhea and vomiting.   Endocrine: Negative for polydipsia and polyuria.   Genitourinary: Negative for difficulty urinating, hematuria and urgency.   Musculoskeletal: Positive for arthralgias and joint swelling. Negative for neck pain.   Neurological: Negative for weakness and headaches.   Psychiatric/Behavioral: Negative for confusion and dysphoric mood.       Objective:        Vitals:    04/11/18 1529   BP: (!) 140/76   BP Location: Right arm   Patient Position: Sitting   BP Method: Large (Manual)   Pulse: 84   Resp: 18   Temp: 98.2 °F (36.8 °C)   TempSrc: Oral   Weight: 117 kg (257 lb 15 oz)   Height: 6' 3" (1.905 m)       Body mass index is 32.24 " kg/m².    Physical Exam   Constitutional: He is oriented to person, place, and time. He appears well-developed and well-nourished. No distress.   HENT:   Head: Normocephalic and atraumatic.   Nose: Nose normal.   Eyes: Conjunctivae and EOM are normal. Right eye exhibits no discharge. Left eye exhibits no discharge.   Neck: Normal range of motion. Neck supple.   Cardiovascular: Normal rate, regular rhythm, normal heart sounds and intact distal pulses.    Pulmonary/Chest: Effort normal and breath sounds normal.   Abdominal: Soft. Bowel sounds are normal.   Musculoskeletal: Normal range of motion. He exhibits no edema.   Neurological: He is alert and oriented to person, place, and time.   Skin: Skin is warm and dry. He is not diaphoretic. No erythema.   Psychiatric: He has a normal mood and affect. His behavior is normal. Thought content normal.       Assessment:     1. Essential hypertension    2. Benign prostatic hyperplasia with nocturia    3. Primary osteoarthritis of both feet           Plan:         1. Essential hypertension  - continue amlodipine 10mg daily  - losartan-hydrochlorothiazide 100-25 mg (HYZAAR) 100-25 mg per tablet; Take 1 tablet by mouth once daily.  Dispense: 30 tablet; Refill: 11  - Renal function panel; Future  - monitor BP daily - email BP log in 4 weeks    2. Benign prostatic hyperplasia with nocturia  - continue flomax    3. Primary osteoarthritis of both feet  - recommend f/u with podiatry, possibly repeat local steroid injection  - d/w pt that ideally would like him to be off of celebrex as chronic use can lead to kidney injury and exacerbate HTN    Patient was instructed to notify clinic if symptoms change, worsen or do not improve.

## 2018-04-17 ENCOUNTER — OFFICE VISIT (OUTPATIENT)
Dept: PODIATRY | Facility: CLINIC | Age: 43
End: 2018-04-17
Payer: COMMERCIAL

## 2018-04-17 VITALS — BODY MASS INDEX: 31.95 KG/M2 | WEIGHT: 257 LBS | HEIGHT: 75 IN

## 2018-04-17 DIAGNOSIS — M19.071 PRIMARY OSTEOARTHRITIS OF BOTH FEET: Primary | ICD-10-CM

## 2018-04-17 DIAGNOSIS — M19.072 PRIMARY OSTEOARTHRITIS OF BOTH FEET: Primary | ICD-10-CM

## 2018-04-17 DIAGNOSIS — M79.672 PAIN IN BOTH FEET: ICD-10-CM

## 2018-04-17 DIAGNOSIS — M79.671 PAIN IN BOTH FEET: ICD-10-CM

## 2018-04-17 PROCEDURE — 99214 OFFICE O/P EST MOD 30 MIN: CPT | Mod: S$GLB,,, | Performed by: PODIATRIST

## 2018-04-17 RX ORDER — METHYLPREDNISOLONE 4 MG/1
4 TABLET ORAL DAILY
Qty: 1 TABLET | Refills: 0 | Status: SHIPPED | OUTPATIENT
Start: 2018-04-17 | End: 2018-10-31

## 2018-04-17 NOTE — PROGRESS NOTES
Chief Complaint   Patient presents with    Heel Pain     Bilateral feet    Foot Pain     Bilateral Top of feet           HPI:   Ned Mcdermott is a 43 y.o. 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  Pain is worse with increased activity and weight bearing, localized to the rearfoot.  He reports a history of injury prior and was in a corrective boot, which seemed to change his gait and made pain worse.   He has tried soaks, frozen water bottle, ibuprofen at home for treatment.   On the weekends when he is not working, there is no pain.  He has been wearing a soft mesh athletic type shoe for the past month.  The work boots he wore prior were too painful to wear.   He has seen an outside podiatrist a few years ago.     10/11/17:  follow up foot pain.  Reports improvement with Celebrex and insoles.  Otherwise, feet do get achy if walking a lot.  Unable to modify activities much.   10/31/17:  follow up foot pain.  Has insoles.  Pain relief also with celebrex.  He is requesting an injection today.       4/17/18:  follow up chronic bilateral heel and dorsal foot pain.  Pain relief with Celebrex but worried about kidney function.  Pain occurs when he wakes up in the morning and can last up to two hours.  Then pain does come back towards the end of the work day.  He gets some relief when he rests and elevates his feet.    He does not report other joint pain.           Past Medical History:   Diagnosis Date    Allergy     Hypertension          Current Outpatient Prescriptions on File Prior to Visit   Medication Sig Dispense Refill    amLODIPine (NORVASC) 10 MG tablet Take 1 tablet (10 mg total) by mouth once daily. 90 tablet 3    fluticasone (FLONASE) 50 mcg/actuation nasal spray 2 sprays (100 mcg total) by Each Nare route once daily. 16 g 12    losartan-hydrochlorothiazide 100-25 mg (HYZAAR)  "100-25 mg per tablet Take 1 tablet by mouth once daily. 30 tablet 11    pantoprazole (PROTONIX) 40 MG tablet Take 1 tablet (40 mg total) by mouth before breakfast. 90 tablet 3    tamsulosin (FLOMAX) 0.4 mg Cp24 Take 1 capsule (0.4 mg total) by mouth once daily. 30 capsule 11    [DISCONTINUED] celecoxib (CELEBREX) 100 MG capsule TAKE 1 CAPSULE (100 MG TOTAL) BY MOUTH 2 (TWO) TIMES DAILY. 40 capsule 3     No current facility-administered medications on file prior to visit.            Review of patient's allergies indicates:  No Known Allergies        Social History     Social History    Marital status:      Spouse name: N/A    Number of children: N/A    Years of education: N/A     Occupational History    Not on file.     Social History Main Topics    Smoking status: Current Every Day Smoker     Packs/day: 0.25     Years: 20.00     Types: Cigarettes    Smokeless tobacco: Never Used    Alcohol use Yes      Comment: socially    Drug use: No    Sexual activity: Yes     Other Topics Concern    Not on file     Social History Narrative    No narrative on file               ROS:   General ROS: negative for - chills, fever or night sweats  Respiratory ROS: no cough, shortness of breath, or wheezing  Cardiovascular ROS: no chest pain or dyspnea on exertion  Musculoskeletal ROS: negative  Neurological ROS: no TIA or stroke symptoms  Dermatological ROS: negative      EXAM:     Vitals:    04/17/18 1612   Weight: 116.6 kg (257 lb)   Height: 6' 3" (1.905 m)        General:  Alert, oriented, no acute distress      RIGHT Lower extremity exam:    Vascular:   Dorsalis Pedis:  present   Posterior Tibial:  present  capillary refill time:  3 seconds  Temperature of toes warm to touch  Hair on toes:  present    Trace edema to dorsal foot.       Neurological:     sharp dull - B/L normal and light touch - B/L normal  No sensorimotor deficits      Dermatological:   There is intacg skin tone, turgor, and temperature.  "   Wounds: none  Erythema:  none      Musculoskeletal:   Metatarsophalangeal, subtalar, and ankle range of motion are adequate ROM, adequate strength  Right foot: no gross deformity  Left foot: no gross deformity          Imagin17 Bilateral feet 3 views.  There are hypertrophic degenerative change at the talonavicular joint bilaterally.  Calcaneal spurs noted bilaterally as well no acute fractures.  Impression degenerative change advanced for age as above..  Ankle 3 views bilateral   Mild DJD.  No fracture or dislocation.  No bone destruction identified            ASSESSMENT/PLAN:        I counseled the patient on his conditions, their implications and medical management.      Primary osteoarthritis of both feet  -     Uric acid; Future; Expected date: 2018  -     Rheumatoid factor; Future; Expected date: 2018  -     Sedimentation rate, manual; Future; Expected date: 2018  -     C-reactive protein; Future; Expected date: 2018  -     methylPREDNISolone (MEDROL DOSEPACK) 4 mg tablet; Take 1 tablet (4 mg total) by mouth once daily. Use as instructed on dose pack  Dispense: 1 tablet; Refill: 0  - Discontinue Celebrex    Pain in both feet  -     Uric acid; Future; Expected date: 2018  -     Rheumatoid factor; Future; Expected date: 2018  -     Sedimentation rate, manual; Future; Expected date: 2018  -     C-reactive protein; Future; Expected date: 2018  -     methylPREDNISolone (MEDROL DOSEPACK) 4 mg tablet; Take 1 tablet (4 mg total) by mouth once daily. Use as instructed on dose pack  Dispense: 1 tablet; Refill: 0  - Shoe and activity modification as needed for relief.  (He states he has been considering another job where he doesn't have to walk so much)      follow up in 2-3 weeks or sooner if concerned.

## 2018-04-18 ENCOUNTER — LAB VISIT (OUTPATIENT)
Dept: LAB | Facility: HOSPITAL | Age: 43
End: 2018-04-18
Attending: INTERNAL MEDICINE
Payer: COMMERCIAL

## 2018-04-18 DIAGNOSIS — M79.672 PAIN IN BOTH FEET: ICD-10-CM

## 2018-04-18 DIAGNOSIS — M79.671 PAIN IN BOTH FEET: ICD-10-CM

## 2018-04-18 DIAGNOSIS — M19.072 PRIMARY OSTEOARTHRITIS OF BOTH FEET: ICD-10-CM

## 2018-04-18 DIAGNOSIS — I10 ESSENTIAL HYPERTENSION: ICD-10-CM

## 2018-04-18 DIAGNOSIS — M19.071 PRIMARY OSTEOARTHRITIS OF BOTH FEET: ICD-10-CM

## 2018-04-18 LAB
ALBUMIN SERPL BCP-MCNC: 4.3 G/DL
ANION GAP SERPL CALC-SCNC: 12 MMOL/L
BUN SERPL-MCNC: 23 MG/DL
CALCIUM SERPL-MCNC: 9.9 MG/DL
CHLORIDE SERPL-SCNC: 108 MMOL/L
CO2 SERPL-SCNC: 24 MMOL/L
CREAT SERPL-MCNC: 1.4 MG/DL
CRP SERPL-MCNC: 2.9 MG/L
ERYTHROCYTE [SEDIMENTATION RATE] IN BLOOD BY WESTERGREN METHOD: 12 MM/HR
EST. GFR  (AFRICAN AMERICAN): >60 ML/MIN/1.73 M^2
EST. GFR  (NON AFRICAN AMERICAN): >60 ML/MIN/1.73 M^2
GLUCOSE SERPL-MCNC: 105 MG/DL
PHOSPHATE SERPL-MCNC: 4.8 MG/DL
POTASSIUM SERPL-SCNC: 4.1 MMOL/L
SODIUM SERPL-SCNC: 144 MMOL/L
URATE SERPL-MCNC: 5.1 MG/DL

## 2018-04-18 PROCEDURE — 86431 RHEUMATOID FACTOR QUANT: CPT

## 2018-04-18 PROCEDURE — 84550 ASSAY OF BLOOD/URIC ACID: CPT

## 2018-04-18 PROCEDURE — 36415 COLL VENOUS BLD VENIPUNCTURE: CPT | Mod: PO

## 2018-04-18 PROCEDURE — 85651 RBC SED RATE NONAUTOMATED: CPT

## 2018-04-18 PROCEDURE — 86140 C-REACTIVE PROTEIN: CPT

## 2018-04-18 PROCEDURE — 80069 RENAL FUNCTION PANEL: CPT

## 2018-04-19 LAB — RHEUMATOID FACT SERPL-ACNC: <10 IU/ML

## 2018-06-12 ENCOUNTER — OFFICE VISIT (OUTPATIENT)
Dept: PODIATRY | Facility: CLINIC | Age: 43
End: 2018-06-12
Payer: COMMERCIAL

## 2018-06-12 VITALS
BODY MASS INDEX: 31.95 KG/M2 | WEIGHT: 257 LBS | SYSTOLIC BLOOD PRESSURE: 140 MMHG | DIASTOLIC BLOOD PRESSURE: 92 MMHG | HEIGHT: 75 IN | HEART RATE: 70 BPM

## 2018-06-12 DIAGNOSIS — M79.672 PAIN IN BOTH FEET: ICD-10-CM

## 2018-06-12 DIAGNOSIS — M19.072 PRIMARY OSTEOARTHRITIS OF BOTH FEET: Primary | ICD-10-CM

## 2018-06-12 DIAGNOSIS — M79.671 PAIN IN BOTH FEET: ICD-10-CM

## 2018-06-12 DIAGNOSIS — M19.071 PRIMARY OSTEOARTHRITIS OF BOTH FEET: Primary | ICD-10-CM

## 2018-06-12 PROCEDURE — 3080F DIAST BP >= 90 MM HG: CPT | Mod: CPTII,S$GLB,, | Performed by: PODIATRIST

## 2018-06-12 PROCEDURE — 3008F BODY MASS INDEX DOCD: CPT | Mod: CPTII,S$GLB,, | Performed by: PODIATRIST

## 2018-06-12 PROCEDURE — 99999 PR PBB SHADOW E&M-EST. PATIENT-LVL III: CPT | Mod: PBBFAC,,, | Performed by: PODIATRIST

## 2018-06-12 PROCEDURE — 99213 OFFICE O/P EST LOW 20 MIN: CPT | Mod: S$GLB,,, | Performed by: PODIATRIST

## 2018-06-12 PROCEDURE — 3077F SYST BP >= 140 MM HG: CPT | Mod: CPTII,S$GLB,, | Performed by: PODIATRIST

## 2018-06-12 NOTE — PROGRESS NOTES
Chief Complaint   Patient presents with    Heel Pain     b/l dr celena ortiz 4/11/18           HPI:   Ned Mcdermott is a 43 y.o. 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  Pain is worse with increased activity and weight bearing, localized to the rearfoot.  He reports a history of injury prior and was in a corrective boot, which seemed to change his gait and made pain worse.   He has tried soaks, frozen water bottle, ibuprofen at home for treatment.   On the weekends when he is not working, there is no pain.  He has been wearing a soft mesh athletic type shoe for the past month.  The work boots he wore prior were too painful to wear.   He has seen an outside podiatrist a few years ago.     10/11/17:  follow up foot pain.  Reports improvement with Celebrex and insoles.  Otherwise, feet do get achy if walking a lot.  Unable to modify activities much.   10/31/17:  follow up foot pain.  Has insoles.  Pain relief also with celebrex.  He is requesting an injection today.     4/17/18:  follow up chronic bilateral heel and dorsal foot pain.  Pain relief with Celebrex but worried about kidney function.  Pain occurs when he wakes up in the morning and can last up to two hours.  Then pain does come back towards the end of the work day.  He gets some relief when he rests and elevates his feet.    He does not report other joint pain.     6/12/18:  follow up bilateral heel and dorsal foot pain, chronic.  Worse at the end of the week after being on his feet for a long time.  Labs were unremarkable.  Etodolac from his wife helps with pain but concerned about NSAIDs affecting his kidneys.  He has gotten new work boots that seem to help with the pain significantly.   Would like to try something for pain though.           Past Medical History:   Diagnosis Date    Allergy     Hypertension          Current  "Outpatient Prescriptions on File Prior to Visit   Medication Sig Dispense Refill    amLODIPine (NORVASC) 10 MG tablet Take 1 tablet (10 mg total) by mouth once daily. 90 tablet 3    losartan-hydrochlorothiazide 100-25 mg (HYZAAR) 100-25 mg per tablet Take 1 tablet by mouth once daily. 30 tablet 11    methylPREDNISolone (MEDROL DOSEPACK) 4 mg tablet Take 1 tablet (4 mg total) by mouth once daily. Use as instructed on dose pack 1 tablet 0    pantoprazole (PROTONIX) 40 MG tablet Take 1 tablet (40 mg total) by mouth before breakfast. 90 tablet 3    tamsulosin (FLOMAX) 0.4 mg Cp24 Take 1 capsule (0.4 mg total) by mouth once daily. 30 capsule 11     No current facility-administered medications on file prior to visit.            Review of patient's allergies indicates:  No Known Allergies        Social History     Social History    Marital status:      Spouse name: N/A    Number of children: N/A    Years of education: N/A     Occupational History    Not on file.     Social History Main Topics    Smoking status: Current Every Day Smoker     Packs/day: 0.25     Years: 20.00     Types: Cigarettes    Smokeless tobacco: Never Used    Alcohol use Yes      Comment: socially    Drug use: No    Sexual activity: Yes     Other Topics Concern    Not on file     Social History Narrative    No narrative on file               ROS:   General ROS: negative for - chills, fever or night sweats  Respiratory ROS: no cough, shortness of breath, or wheezing  Cardiovascular ROS: no chest pain or dyspnea on exertion  Musculoskeletal ROS: negative  Neurological ROS: no TIA or stroke symptoms  Dermatological ROS: negative      EXAM:     Vitals:    06/12/18 1508   BP: (!) 140/92   Pulse: 70   Weight: 116.6 kg (257 lb)   Height: 6' 3" (1.905 m)        General:  Alert, oriented, no acute distress      RIGHT Lower extremity exam:    Vascular:   Dorsalis Pedis:  present   Posterior Tibial:  present  capillary refill time:  3 " seconds  Temperature of toes warm to touch  Hair on toes:  present    Trace edema to dorsal foot.       Neurological:     sharp dull - B/L normal and light touch - B/L normal  No sensorimotor deficits      Dermatological:   There is intacg skin tone, turgor, and temperature.    Wounds: none  Erythema:  none      Musculoskeletal:   Metatarsophalangeal, subtalar, and ankle range of motion are adequate ROM, adequate strength  Right foot: no gross deformity  Left foot: no gross deformity          Imagin17 Bilateral feet 3 views.  There are hypertrophic degenerative change at the talonavicular joint bilaterally.  Calcaneal spurs noted bilaterally as well no acute fractures.  Impression degenerative change advanced for age as above..  Ankle 3 views bilateral   Mild DJD.  No fracture or dislocation.  No bone destruction identified            ASSESSMENT/PLAN:        I counseled the patient on his conditions, their implications and medical management.      Primary osteoarthritis of both feet  -     Ambulatory Referral to Pain Clinic    Pain in both feet  -     Ambulatory Referral to Pain Clinic      Continue work boots; shoe and activity modification as needed for comfort.  OTC analgesics (ex Bengay, Aspercreme) prn  See PCP or pain clinic for pain medication management.

## 2018-09-25 DIAGNOSIS — K21.9 GASTROESOPHAGEAL REFLUX DISEASE WITHOUT ESOPHAGITIS: ICD-10-CM

## 2018-09-25 RX ORDER — PANTOPRAZOLE SODIUM 40 MG/1
40 TABLET, DELAYED RELEASE ORAL
Qty: 90 TABLET | Refills: 3 | Status: SHIPPED | OUTPATIENT
Start: 2018-09-25 | End: 2018-10-31 | Stop reason: SDUPTHER

## 2018-10-28 RX ORDER — AMLODIPINE BESYLATE 10 MG/1
10 TABLET ORAL DAILY
Qty: 90 TABLET | Refills: 3 | Status: SHIPPED | OUTPATIENT
Start: 2018-10-28 | End: 2019-01-08 | Stop reason: SDUPTHER

## 2018-10-31 ENCOUNTER — OFFICE VISIT (OUTPATIENT)
Dept: INTERNAL MEDICINE | Facility: CLINIC | Age: 43
End: 2018-10-31
Payer: COMMERCIAL

## 2018-10-31 ENCOUNTER — HOSPITAL ENCOUNTER (OUTPATIENT)
Dept: RADIOLOGY | Facility: HOSPITAL | Age: 43
Discharge: HOME | End: 2018-10-31
Attending: INTERNAL MEDICINE
Payer: COMMERCIAL

## 2018-10-31 VITALS
BODY MASS INDEX: 32.48 KG/M2 | DIASTOLIC BLOOD PRESSURE: 80 MMHG | RESPIRATION RATE: 16 BRPM | HEART RATE: 83 BPM | HEIGHT: 75 IN | WEIGHT: 261.25 LBS | TEMPERATURE: 98 F | SYSTOLIC BLOOD PRESSURE: 122 MMHG

## 2018-10-31 DIAGNOSIS — Z00.00 ANNUAL PHYSICAL EXAM: Primary | ICD-10-CM

## 2018-10-31 DIAGNOSIS — M79.89 SWELLING OF BOTH HANDS: ICD-10-CM

## 2018-10-31 DIAGNOSIS — N40.1 BENIGN PROSTATIC HYPERPLASIA WITH NOCTURIA: ICD-10-CM

## 2018-10-31 DIAGNOSIS — R73.03 PRE-DIABETES: ICD-10-CM

## 2018-10-31 DIAGNOSIS — Z72.0 TOBACCO USE: ICD-10-CM

## 2018-10-31 DIAGNOSIS — Z23 NEED FOR TD VACCINE: ICD-10-CM

## 2018-10-31 DIAGNOSIS — Z79.899 CURRENT USE OF PROTON PUMP INHIBITOR: ICD-10-CM

## 2018-10-31 DIAGNOSIS — I10 ESSENTIAL HYPERTENSION: ICD-10-CM

## 2018-10-31 DIAGNOSIS — M25.649 JOINT STIFFNESS OF HAND, UNSPECIFIED LATERALITY: ICD-10-CM

## 2018-10-31 DIAGNOSIS — R35.1 BENIGN PROSTATIC HYPERPLASIA WITH NOCTURIA: ICD-10-CM

## 2018-10-31 DIAGNOSIS — K21.9 GASTROESOPHAGEAL REFLUX DISEASE WITHOUT ESOPHAGITIS: ICD-10-CM

## 2018-10-31 PROCEDURE — 90471 IMMUNIZATION ADMIN: CPT | Mod: S$GLB,,, | Performed by: INTERNAL MEDICINE

## 2018-10-31 PROCEDURE — 99396 PREV VISIT EST AGE 40-64: CPT | Mod: 25,S$GLB,, | Performed by: INTERNAL MEDICINE

## 2018-10-31 PROCEDURE — 73130 X-RAY EXAM OF HAND: CPT | Mod: 50,TC,PO

## 2018-10-31 PROCEDURE — 99999 PR PBB SHADOW E&M-EST. PATIENT-LVL IV: CPT | Mod: PBBFAC,,, | Performed by: INTERNAL MEDICINE

## 2018-10-31 PROCEDURE — 73130 X-RAY EXAM OF HAND: CPT | Mod: 26,50,, | Performed by: RADIOLOGY

## 2018-10-31 PROCEDURE — 90714 TD VACC NO PRESV 7 YRS+ IM: CPT | Mod: S$GLB,,, | Performed by: INTERNAL MEDICINE

## 2018-10-31 RX ORDER — PANTOPRAZOLE SODIUM 40 MG/1
40 TABLET, DELAYED RELEASE ORAL
Qty: 90 TABLET | Refills: 3 | Status: SHIPPED | OUTPATIENT
Start: 2018-10-31 | End: 2019-10-31

## 2018-10-31 NOTE — PROGRESS NOTES
Subjective:       Patient ID: Ned Mcdermott is a 43 y.o. male.    Chief Complaint: Annual Exam    HPI    43 y.o. male here for annual exam.     Health Maintenance:   Lipid disorders/ASCVD risk: due    DM: A1c due  Eye exam: due - [facility] vision - vision corrected        Vaccines:   Influenza (yearly) utd 9/5/18  Tetanus (every 10 yrs - 1st tdap) 2013    PPSV23(>66yo or <65 w/ lung dz, smoking, DM) 09/2017         Medical Problems:  1. HTN: controlled on amlodipine 10mg daily and losartan-hctz 100-25mg daily  2. GERD: well controlled on pantoprazole 40mg daily  3. BPH: flomax 0.4mg daily    He reports bilateral hand swelling intermittently for past few months. It worsens after work- he works as  for [employer] so he thinks that walking with his hands down can contribute to symptoms. He denies numbness or tingling, or weakness.   Past Medical History:   Diagnosis Date    Allergy     Hypertension      Past Surgical History:   Procedure Laterality Date    SHOULDER ARTHROSCOPY Right      Family History   Problem Relation Age of Onset    Hypertension Mother     Stroke Mother     Heart disease Father     Hypertension Father     Hypertension Sister     Hypertension Brother     No Known Problems Son      Social History     Socioeconomic History    Marital status:      Spouse name: Not on file    Number of children: Not on file    Years of education: Not on file    Highest education level: Not on file   Social Needs    Financial resource strain: Not on file    Food insecurity - worry: Not on file    Food insecurity - inability: Not on file    Transportation needs - medical: Not on file    Transportation needs - non-medical: Not on file   Occupational History    Not on file   Tobacco Use    Smoking status: Current Every Day Smoker     Packs/day: 0.25     Years: 20.00     Pack years: 5.00     Types: Cigarettes    Smokeless tobacco: Never Used   Substance and Sexual Activity     "Alcohol use: Yes     Comment: socially    Drug use: No    Sexual activity: Yes   Other Topics Concern    Not on file   Social History Narrative    Not on file     Review of patient's allergies indicates:  No Known Allergies    Current Outpatient Medications:     amLODIPine (NORVASC) 10 MG tablet, TAKE 1 TABLET (10 MG TOTAL) BY MOUTH ONCE DAILY., Disp: 90 tablet, Rfl: 3    losartan-hydrochlorothiazide 100-25 mg (HYZAAR) 100-25 mg per tablet, Take 1 tablet by mouth once daily., Disp: 30 tablet, Rfl: 11    pantoprazole (PROTONIX) 40 MG tablet, Take 1 tablet (40 mg total) by mouth before breakfast., Disp: 90 tablet, Rfl: 3    tamsulosin (FLOMAX) 0.4 mg Cp24, Take 1 capsule (0.4 mg total) by mouth once daily., Disp: 30 capsule, Rfl: 11    FLUCELVAX QUAD 6167-1034, PF, 60 mcg (15 mcg x 4)/0.5 mL Syrg vaccine, , Disp: , Rfl:       Review of Systems   Constitutional: Negative for fatigue and unexpected weight change.   HENT: Positive for postnasal drip. Negative for sinus pain and trouble swallowing.    Eyes: Negative for discharge and redness.   Respiratory: Negative for cough and shortness of breath.    Cardiovascular: Negative for chest pain and leg swelling.   Gastrointestinal: Negative for abdominal pain, blood in stool, constipation and diarrhea.   Genitourinary: Negative for difficulty urinating, dysuria and frequency.   Musculoskeletal: Positive for arthralgias and joint swelling (hand swelling). Negative for gait problem.   Skin: Negative for pallor, rash and wound.   Neurological: Negative for numbness and headaches.       Objective:        Vitals:    10/31/18 1452 10/31/18 1529   BP: (!) 141/98 122/80   Pulse: 83    Resp: 16    Temp: 98.4 °F (36.9 °C)    TempSrc: Oral    Weight: 118.5 kg (261 lb 3.9 oz)    Height: 6' 3" (1.905 m)        Body mass index is 32.65 kg/m².    Physical Exam   Constitutional: He is oriented to person, place, and time. He appears well-developed. No distress.   HENT:   Head: " Normocephalic and atraumatic.   Right Ear: Tympanic membrane normal.   Left Ear: Tympanic membrane normal.   Nose: Nose normal.   Mouth/Throat: Oropharynx is clear and moist.   Eyes: Conjunctivae and EOM are normal. Pupils are equal, round, and reactive to light.   Neck: Normal range of motion. Neck supple. No tracheal deviation present. No thyromegaly present.   Cardiovascular: Normal rate, regular rhythm, normal heart sounds and intact distal pulses.   Pulmonary/Chest: Effort normal and breath sounds normal.   Abdominal: Soft. Bowel sounds are normal. He exhibits no distension and no mass. There is no tenderness.   Musculoskeletal: Normal range of motion. He exhibits no edema.        Right hand: He exhibits swelling. He exhibits normal range of motion and no tenderness. Normal sensation noted. Normal strength noted.        Left hand: He exhibits swelling. He exhibits normal range of motion and no tenderness. Normal sensation noted. Normal strength noted.   Lymphadenopathy:     He has no cervical adenopathy.   Neurological: He is alert and oriented to person, place, and time. No sensory deficit.   Skin: Skin is warm and dry. He is not diaphoretic. No cyanosis. Nails show no clubbing.   Psychiatric: He has a normal mood and affect. His behavior is normal. Judgment normal.       Assessment:     1. Annual physical exam    2. Essential hypertension    3. Pre-diabetes    4. Need for Td vaccine    5. Tobacco use    6. Benign prostatic hyperplasia with nocturia    7. Swelling of both hands    8. Joint stiffness of hand, unspecified laterality    9. Gastroesophageal reflux disease without esophagitis    10. Current use of proton pump inhibitor           Plan:         1. Annual physical exam  - flu shot utd  - CBC auto differential; Future  - Comprehensive metabolic panel; Future  - Hemoglobin A1c; Future  - Lipid panel; Future  - TSH; Future  - Urinalysis; Future  - Vitamin D; Future  - PSA, Screening; Future    2.  Essential hypertension  - stable, continue current meds  - Comprehensive metabolic panel; Future    3. Pre-diabetes  - Hemoglobin A1c; Future    4. Need for Td vaccine  - (In Office Administered) Td Vaccine - Preservative Free    5. Tobacco use  - Ambulatory referral to Smoking Cessation Program    6. Benign prostatic hyperplasia with nocturia  - stable, continue current meds  - PSA, Screening; Future    7. Swelling of both hands  - given joint stiffness, eval for RA. Cont anti-HTN, check electrolytes, kidney function. Possibly carpal tunnel syndrome- trial of splints at night, which he already has at home.   - Cyclic citrul peptide antibody, IgG; Future  - X-Ray Hand 3 View Bilateral; Future    8. Joint stiffness of hand, unspecified laterality  - Cyclic citrul peptide antibody, IgG; Future  - X-Ray Hand 3 View Bilateral; Future    9. Gastroesophageal reflux disease without esophagitis  - pantoprazole (PROTONIX) 40 MG tablet; Take 1 tablet (40 mg total) by mouth before breakfast.  Dispense: 90 tablet; Refill: 3    10. Current use of proton pump inhibitor  - Vitamin B12; Future           Patient note was created using MModal Dictation.  Any errors in syntax or even information may not have been identified and edited on initial review prior to signing this note.

## 2018-11-01 ENCOUNTER — PATIENT MESSAGE (OUTPATIENT)
Dept: INTERNAL MEDICINE | Facility: CLINIC | Age: 43
End: 2018-11-01

## 2018-11-01 DIAGNOSIS — I10 ESSENTIAL HYPERTENSION: ICD-10-CM

## 2018-11-01 DIAGNOSIS — R74.01 ELEVATED ALT MEASUREMENT: ICD-10-CM

## 2018-11-01 DIAGNOSIS — R73.03 PRE-DIABETES: Primary | ICD-10-CM

## 2018-11-01 NOTE — TELEPHONE ENCOUNTER
Message sent and results released to patient portal.    rtc 6 mo w/ labs prior for HTN and pre-DM.

## 2018-11-08 ENCOUNTER — CLINICAL SUPPORT (OUTPATIENT)
Dept: SMOKING CESSATION | Facility: CLINIC | Age: 43
End: 2018-11-08
Payer: COMMERCIAL

## 2018-11-08 VITALS
DIASTOLIC BLOOD PRESSURE: 88 MMHG | HEART RATE: 83 BPM | SYSTOLIC BLOOD PRESSURE: 152 MMHG | BODY MASS INDEX: 33.56 KG/M2 | WEIGHT: 268.5 LBS

## 2018-11-08 DIAGNOSIS — F17.210 NICOTINE DEPENDENCE, CIGARETTES, UNCOMPLICATED: Primary | ICD-10-CM

## 2018-11-08 PROCEDURE — 99404 PREV MED CNSL INDIV APPRX 60: CPT | Mod: S$GLB,,,

## 2018-11-08 RX ORDER — IBUPROFEN 200 MG
1 TABLET ORAL DAILY
Qty: 28 PATCH | Refills: 0 | Status: SHIPPED | OUTPATIENT
Start: 2018-11-08 | End: 2018-11-27 | Stop reason: SDUPTHER

## 2018-11-08 RX ORDER — MICONAZOLE NITRATE 2 %
2 CREAM (GRAM) TOPICAL
Qty: 220 EACH | Refills: 0 | Status: SHIPPED | OUTPATIENT
Start: 2018-11-08

## 2018-11-08 NOTE — PROGRESS NOTES
11/8/18   See Smoking Cessation Smart Form    Additional Interventions:  · Recommended patient participate in Smoking Cessation Group .  · Discussed triggers and planning for quit date.  · Given patient education handouts from [employer]ician Tool Kit #3  · Educated patient about and gave patient education handouts from  [employer] Drug Information on: NRT, Wellbutrin, Chantix  · Provided phone number to reach [facility] CTTS (Certified Tobacco Treatment Specialist) for future assistance and numbers to 24/7 Quit lines.

## 2018-11-15 ENCOUNTER — CLINICAL SUPPORT (OUTPATIENT)
Dept: SMOKING CESSATION | Facility: CLINIC | Age: 43
End: 2018-11-15
Payer: COMMERCIAL

## 2018-11-15 DIAGNOSIS — F17.210 NICOTINE DEPENDENCE, CIGARETTES, UNCOMPLICATED: Primary | ICD-10-CM

## 2018-11-15 PROCEDURE — 99407 BEHAV CHNG SMOKING > 10 MIN: CPT | Mod: S$GLB,,,

## 2018-11-27 ENCOUNTER — CLINICAL SUPPORT (OUTPATIENT)
Dept: SMOKING CESSATION | Facility: CLINIC | Age: 43
End: 2018-11-27
Payer: COMMERCIAL

## 2018-11-27 DIAGNOSIS — F17.210 NICOTINE DEPENDENCE, CIGARETTES, UNCOMPLICATED: Primary | ICD-10-CM

## 2018-11-27 DIAGNOSIS — F17.210 NICOTINE DEPENDENCE, CIGARETTES, UNCOMPLICATED: ICD-10-CM

## 2018-11-27 PROCEDURE — 99406 BEHAV CHNG SMOKING 3-10 MIN: CPT | Mod: S$GLB,,,

## 2018-11-28 RX ORDER — IBUPROFEN 200 MG
1 TABLET ORAL DAILY
Qty: 28 PATCH | Refills: 0 | Status: SHIPPED | OUTPATIENT
Start: 2018-11-28 | End: 2019-01-04 | Stop reason: SDUPTHER

## 2018-12-03 ENCOUNTER — CLINICAL SUPPORT (OUTPATIENT)
Dept: SMOKING CESSATION | Facility: CLINIC | Age: 43
End: 2018-12-03
Payer: COMMERCIAL

## 2018-12-03 DIAGNOSIS — F17.210 NICOTINE DEPENDENCE, CIGARETTES, UNCOMPLICATED: Primary | ICD-10-CM

## 2018-12-03 PROCEDURE — 99402 PREV MED CNSL INDIV APPRX 30: CPT | Mod: S$GLB,,,

## 2018-12-03 NOTE — PROGRESS NOTES
Individual Follow-Up Form    12/3/2018    Quit Date:   10/20/18    Clinical Status of Patient: Outpatient    Length of Service: 30 minutes    Continuing Medication:   Nicotine patches & gum    Target Symptoms: Withdrawal and medication side effects. The following were  rated moderate (3) to severe (4) on TCRS:  · Moderate (3): none  · Severe (4):  none    Comments:   Patient has remained smoke free since before his quit date without any slips.  I acknowledged this and commended him on all the hard work it takes to avoid smoking. He reported the nicotine patches are effective for him in controlling any withdrawal symptoms.  Patient finding it is getting easier, less intense/ less frequent urges and cravings and is consistently changing routines to disconnect from automatic, routine smoking at the usual/habitual times.  Changing the way urges are perceived so perception is less aversive and acknowledged as a temporary and normal occurrence of quitting that can be moved through without smoking.  Reinforced healthy lifestyle changes consistent with staying quit.  Motivation and confidence remain high.  Patients supports are positive and involved.      Diagnosis: F17.210    Next Visit:  1/3/19

## 2019-01-03 ENCOUNTER — CLINICAL SUPPORT (OUTPATIENT)
Dept: SMOKING CESSATION | Facility: CLINIC | Age: 44
End: 2019-01-03
Payer: COMMERCIAL

## 2019-01-03 DIAGNOSIS — F17.210 NICOTINE DEPENDENCE, CIGARETTES, UNCOMPLICATED: ICD-10-CM

## 2019-01-03 DIAGNOSIS — F17.210 NICOTINE DEPENDENCE, CIGARETTES, UNCOMPLICATED: Primary | ICD-10-CM

## 2019-01-03 PROCEDURE — 99402 PREV MED CNSL INDIV APPRX 30: CPT | Mod: S$GLB,,,

## 2019-01-03 PROCEDURE — 99402 PR PREVENT COUNSEL,INDIV,30 MIN: ICD-10-PCS | Mod: S$GLB,,,

## 2019-01-04 RX ORDER — IBUPROFEN 200 MG
1 TABLET ORAL DAILY
Qty: 28 PATCH | Refills: 0 | Status: SHIPPED | OUTPATIENT
Start: 2019-01-04

## 2019-01-04 NOTE — PROGRESS NOTES
Individual Follow-Up Form    1/3/2019    Quit Date: 10/20/18  Clinical Status of Patient: Outpatient  Length of Service: 30 minutes    Continuing Medication:   Nicotine patch & gum    Target Symptoms: Withdrawal and medication side effects. The following were  rated moderate (3) to severe (4) on TCRS:  · Moderate (3): none  · Severe (4): none    Comments:   Patient has remained quit without slips and I commended him on continued cessation.  Acknowledged there are some days when urges are harder to deal with but said they are less intense and less frequent.  Shared that it is not nearly as difficult at home as it is at work do cope with urges. For his work he is out in the public a lot so he is exposed to other smokers. Also when he is feeling aggravated.   He said he knows as long as he does not buy any cigarettes he will not smoke.  We discussed at length his concerns about remaining smoke free when he is no longer taking medication.  I assisted him to review all the changes he has made that had nothing to do with the medication. He uses several positive coping strategies:  Changed many routines, uses breathing to move through urges which he reminds himself are temporary, keeps his hands busy, distracts himself, reminds himself of the reasons he quit in the first place.    Diagnosis: F17.210    Next Visit:    1/31/19

## 2019-01-08 RX ORDER — AMLODIPINE BESYLATE 10 MG/1
10 TABLET ORAL DAILY
Qty: 90 TABLET | Refills: 1 | Status: SHIPPED | OUTPATIENT
Start: 2019-01-08 | End: 2019-06-27 | Stop reason: SDUPTHER

## 2019-01-31 ENCOUNTER — TELEPHONE (OUTPATIENT)
Dept: SMOKING CESSATION | Facility: CLINIC | Age: 44
End: 2019-01-31

## 2019-01-31 NOTE — TELEPHONE ENCOUNTER
1/31/19    4:20 pm    Telephone call to patient to follow up on continued smoking cessation and missed individual counseling session..  Left voice mail #1 for return call.

## 2019-02-05 ENCOUNTER — CLINICAL SUPPORT (OUTPATIENT)
Dept: SMOKING CESSATION | Facility: CLINIC | Age: 44
End: 2019-02-05
Payer: COMMERCIAL

## 2019-02-05 DIAGNOSIS — F17.210 NICOTINE DEPENDENCE, CIGARETTES, UNCOMPLICATED: Primary | ICD-10-CM

## 2019-02-05 PROCEDURE — 99402 PREV MED CNSL INDIV APPRX 30: CPT | Mod: S$GLB,,,

## 2019-02-05 PROCEDURE — 99402 PR PREVENT COUNSEL,INDIV,30 MIN: ICD-10-PCS | Mod: S$GLB,,,

## 2019-02-05 NOTE — PROGRESS NOTES
Individual Follow-Up Form    2/5/2019    Quit Date:  10/20/18    Clinical Status of Patient: Outpatient    Length of Service: 30 minutes    Continuing Medication:  Nicotine patch and lozenges    Target Symptoms: Withdrawal and medication side effects. The following were  rated moderate (3) to severe (4) on TCRS:  · Moderate (3):  none  · Severe (4):   nonoe    Comments:  Patient has remained smoke free now since his quit date which is a little over 3 months.  I acknowledged this accomplishment and the changes he has had to make for this to happen.  He no longer feels controlled or pressured at various times as he did in the past when he was smoking.  He said his cravings are minimal if at all.  He has altered his life so that he is not doing the same things he did when he was smoking.  He handles stress differently now.  He steps back and takes the time to allow the emotional intensity to decrease before taking action.  His spirituality is another significant support and coping mechanism.  He works more often on accepting the things he cannot control.  He has things he does for enjoyment and relaxation.  He has not been using the nicotine patch every day as he has been working toward not using them at all.  His next step is to not use the patches at all and only use the nicotine lozenges if needed.    Diagnosis: F17.210    Next Visit:  3/5/19

## 2019-02-16 DIAGNOSIS — R35.1 NOCTURIA: ICD-10-CM

## 2019-02-17 RX ORDER — TAMSULOSIN HYDROCHLORIDE 0.4 MG/1
CAPSULE ORAL
Qty: 30 CAPSULE | Refills: 3 | Status: SHIPPED | OUTPATIENT
Start: 2019-02-17

## 2019-03-16 DIAGNOSIS — I10 ESSENTIAL HYPERTENSION: ICD-10-CM

## 2019-03-17 RX ORDER — LOSARTAN POTASSIUM AND HYDROCHLOROTHIAZIDE 25; 100 MG/1; MG/1
1 TABLET ORAL DAILY
Qty: 30 TABLET | Refills: 1 | Status: SHIPPED | OUTPATIENT
Start: 2019-03-17 | End: 2019-07-15 | Stop reason: SDUPTHER

## 2019-05-14 ENCOUNTER — CLINICAL SUPPORT (OUTPATIENT)
Dept: SMOKING CESSATION | Facility: CLINIC | Age: 44
End: 2019-05-14
Payer: COMMERCIAL

## 2019-05-14 DIAGNOSIS — F17.200 NICOTINE DEPENDENCE: Primary | ICD-10-CM

## 2019-05-14 PROCEDURE — 99407 PR TOBACCO USE CESSATION INTENSIVE >10 MINUTES: ICD-10-PCS | Mod: S$GLB,,, | Performed by: INTERNAL MEDICINE

## 2019-05-14 PROCEDURE — 99407 BEHAV CHNG SMOKING > 10 MIN: CPT | Mod: S$GLB,,, | Performed by: INTERNAL MEDICINE

## 2019-05-14 NOTE — PROGRESS NOTES
Spoke with patient today in regard to smoking cessation progress for 3/6 month telephone follow up, he states tobacco free. Commended patient on the accomplishment. Patient states using the prescribed tobacco cessation medication regimen of nicotine patches to help aid in his quit. Patient states having some slips but able to get back on track. Informed patient of benefit period, future follow up, and contact information if any further help or support is needed. Will complete smart form for 3 and 6 month follow up on Quit attempt #1.

## 2019-06-11 DIAGNOSIS — I10 ESSENTIAL HYPERTENSION: ICD-10-CM

## 2019-06-11 RX ORDER — LOSARTAN POTASSIUM AND HYDROCHLOROTHIAZIDE 25; 100 MG/1; MG/1
1 TABLET ORAL DAILY
Qty: 30 TABLET | Refills: 1 | OUTPATIENT
Start: 2019-06-11 | End: 2020-06-10

## 2019-06-21 DIAGNOSIS — I10 ESSENTIAL HYPERTENSION: ICD-10-CM

## 2019-06-21 RX ORDER — LOSARTAN POTASSIUM AND HYDROCHLOROTHIAZIDE 25; 100 MG/1; MG/1
1 TABLET ORAL DAILY
Qty: 30 TABLET | Refills: 1 | OUTPATIENT
Start: 2019-06-21 | End: 2020-06-20

## 2019-06-27 DIAGNOSIS — I10 ESSENTIAL HYPERTENSION: ICD-10-CM

## 2019-06-27 RX ORDER — LOSARTAN POTASSIUM AND HYDROCHLOROTHIAZIDE 25; 100 MG/1; MG/1
1 TABLET ORAL DAILY
Qty: 30 TABLET | Refills: 1 | OUTPATIENT
Start: 2019-06-27 | End: 2020-06-26

## 2019-06-27 RX ORDER — AMLODIPINE BESYLATE 10 MG/1
TABLET ORAL
Qty: 30 TABLET | Refills: 0 | Status: SHIPPED | OUTPATIENT
Start: 2019-06-27 | End: 2019-07-15 | Stop reason: SDUPTHER

## 2019-07-10 DIAGNOSIS — R35.1 NOCTURIA: ICD-10-CM

## 2019-07-10 RX ORDER — TAMSULOSIN HYDROCHLORIDE 0.4 MG/1
CAPSULE ORAL
Qty: 30 CAPSULE | Refills: 3 | OUTPATIENT
Start: 2019-07-10

## 2019-07-15 DIAGNOSIS — I10 ESSENTIAL HYPERTENSION: ICD-10-CM

## 2019-07-15 RX ORDER — AMLODIPINE BESYLATE 10 MG/1
10 TABLET ORAL DAILY
Qty: 30 TABLET | Refills: 0 | Status: SHIPPED | OUTPATIENT
Start: 2019-07-15 | End: 2019-12-15 | Stop reason: SDUPTHER

## 2019-07-15 RX ORDER — LOSARTAN POTASSIUM AND HYDROCHLOROTHIAZIDE 25; 100 MG/1; MG/1
1 TABLET ORAL DAILY
Qty: 30 TABLET | Refills: 1 | Status: SHIPPED | OUTPATIENT
Start: 2019-07-15 | End: 2020-07-14

## 2019-11-21 ENCOUNTER — CLINICAL SUPPORT (OUTPATIENT)
Dept: SMOKING CESSATION | Facility: CLINIC | Age: 44
End: 2019-11-21
Payer: COMMERCIAL

## 2019-11-21 DIAGNOSIS — F17.200 NICOTINE DEPENDENCE: Primary | ICD-10-CM

## 2019-11-21 PROCEDURE — 99407 PR TOBACCO USE CESSATION INTENSIVE >10 MINUTES: ICD-10-PCS | Mod: S$GLB,,,

## 2019-11-21 PROCEDURE — 99407 BEHAV CHNG SMOKING > 10 MIN: CPT | Mod: S$GLB,,,

## 2019-11-21 NOTE — PROGRESS NOTES
Spoke with patient today in regard to smoking cessation progress for 12- month phone follow up, he states tobacco free. Commended patient on the accomplishment.  Informed patient of benefit period, future follow up, and contact information if any further help or support is needed. Will complete smart form for 12- month follow up on Quit attempt #1.

## 2019-12-16 RX ORDER — AMLODIPINE BESYLATE 10 MG/1
TABLET ORAL
Qty: 30 TABLET | Refills: 0 | Status: SHIPPED | OUTPATIENT
Start: 2019-12-16 | End: 2020-01-17

## 2020-01-17 ENCOUNTER — TELEPHONE (OUTPATIENT)
Dept: INTERNAL MEDICINE | Facility: CLINIC | Age: 45
End: 2020-01-17

## 2020-01-17 RX ORDER — AMLODIPINE BESYLATE 10 MG/1
TABLET ORAL
Qty: 30 TABLET | Refills: 0 | Status: SHIPPED | OUTPATIENT
Start: 2020-01-17

## 2020-10-05 ENCOUNTER — PATIENT MESSAGE (OUTPATIENT)
Dept: ADMINISTRATIVE | Facility: HOSPITAL | Age: 45
End: 2020-10-05

## 2021-01-04 ENCOUNTER — PATIENT MESSAGE (OUTPATIENT)
Dept: ADMINISTRATIVE | Facility: HOSPITAL | Age: 46
End: 2021-01-04

## 2021-04-05 ENCOUNTER — PATIENT MESSAGE (OUTPATIENT)
Dept: ADMINISTRATIVE | Facility: HOSPITAL | Age: 46
End: 2021-04-05

## 2021-06-29 ENCOUNTER — TELEPHONE (OUTPATIENT)
Dept: INTERNAL MEDICINE | Facility: CLINIC | Age: 46
End: 2021-06-29

## 2021-06-29 DIAGNOSIS — R73.03 PRE-DIABETES: ICD-10-CM

## 2021-06-29 DIAGNOSIS — Z00.00 ANNUAL PHYSICAL EXAM: Primary | ICD-10-CM

## 2021-10-04 ENCOUNTER — PATIENT MESSAGE (OUTPATIENT)
Dept: ADMINISTRATIVE | Facility: HOSPITAL | Age: 46
End: 2021-10-04

## 2022-07-29 ENCOUNTER — HOSPITAL ENCOUNTER (EMERGENCY)
Facility: HOSPITAL | Age: 47
Discharge: HOME | End: 2022-07-29
Attending: EMERGENCY MEDICINE
Payer: COMMERCIAL

## 2022-07-29 VITALS
SYSTOLIC BLOOD PRESSURE: 144 MMHG | HEART RATE: 92 BPM | OXYGEN SATURATION: 100 % | DIASTOLIC BLOOD PRESSURE: 75 MMHG | TEMPERATURE: 99 F | RESPIRATION RATE: 18 BRPM

## 2022-07-29 DIAGNOSIS — S99.929A FOOT INJURY: ICD-10-CM

## 2022-07-29 DIAGNOSIS — S92.344A NONDISPLACED FRACTURE OF FOURTH METATARSAL BONE, RIGHT FOOT, INITIAL ENCOUNTER FOR CLOSED FRACTURE: Primary | ICD-10-CM

## 2022-07-29 DIAGNOSIS — S92.324A NONDISPLACED FRACTURE OF SECOND METATARSAL BONE, RIGHT FOOT, INITIAL ENCOUNTER FOR CLOSED FRACTURE: ICD-10-CM

## 2022-07-29 PROCEDURE — 25000003 PHARM REV CODE 250: Performed by: EMERGENCY MEDICINE

## 2022-07-29 PROCEDURE — 99283 EMERGENCY DEPT VISIT LOW MDM: CPT

## 2022-07-29 RX ORDER — HYDROCODONE BITARTRATE AND ACETAMINOPHEN 10; 325 MG/1; MG/1
1 TABLET ORAL EVERY 6 HOURS PRN
Qty: 12 TABLET | Refills: 0 | Status: SHIPPED | OUTPATIENT
Start: 2022-07-29 | End: 2022-08-01

## 2022-07-29 RX ORDER — HYDROCODONE BITARTRATE AND ACETAMINOPHEN 10; 325 MG/1; MG/1
1 TABLET ORAL
Status: COMPLETED | OUTPATIENT
Start: 2022-07-29 | End: 2022-07-29

## 2022-07-29 RX ADMIN — HYDROCODONE BITARTRATE AND ACETAMINOPHEN 1 TABLET: 10; 325 TABLET ORAL at 01:07

## 2022-07-29 NOTE — ED PROVIDER NOTES
Encounter Date: 7/29/2022    SCRIBE #1 NOTE: I, Emily Robison, am scribing for, and in the presence of, Kings Bautista MD.       History     Chief Complaint   Patient presents with    Foot Injury     R foot pinned between the wall and a forklift while moving. +swelling     Time seen by provider: 1:36 AM on 07/29/2022    Ned Mcdermott is a 47 y.o. 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 EMS reports no crepitus, normal motor and sensory function, and gave the patient an ice pack. The patient states that he mostly has pain and swelling on the top of his right foot and in the ankle. He states his tetanus shot is up to date. The patient denies fever or any other symptoms at this time. The patient has a PMHx of arthritis in his hands and feet and HTN. He does not take any medication for his arthritis. He also reports breaking his foot as kid. The patient has no pertinent PSHx.      The history is provided by the patient and the EMS personnel.     Review of patient's allergies indicates:  No Known Allergies  Past Medical History:   Diagnosis Date    Allergy     Hypertension      Past Surgical History:   Procedure Laterality Date    SHOULDER ARTHROSCOPY Right      Family History   Problem Relation Age of Onset    Hypertension Mother     Stroke Mother     Heart disease Father     Hypertension Father     Hypertension Sister     Hypertension Brother     No Known Problems Son      Social History     Tobacco Use    Smoking status: Former Smoker     Packs/day: 0.25     Years: 20.00     Pack years: 5.00     Types: Cigarettes     Quit date: 10/20/2018     Years since quitting: 3.7    Smokeless tobacco: Never Used   Substance Use Topics    Alcohol use: Yes     Comment: socially    Drug use: No     Review of Systems   Constitutional:  Negative for fever.   HENT: Negative for congestion.    Eyes: Negative for visual disturbance.   Respiratory: Negative for wheezing.    Cardiovascular: Negative for chest pain.   Gastrointestinal: Negative for abdominal pain.   Genitourinary: Negative for dysuria.   Musculoskeletal: Positive for arthralgias. Negative for joint swelling.        Positive for swelling.   Skin: Positive for wound. Negative for rash.   Neurological: Negative for syncope.   Hematological: Does not bruise/bleed easily.   Psychiatric/Behavioral: Negative for confusion.       Physical Exam     Initial Vitals   BP Pulse Resp Temp SpO2   07/29/22 0130 07/29/22 0129 07/29/22 0128 07/29/22 0128 07/29/22 0129   (!) 144/75 92 20 98.5 °F (36.9 °C) 100 %      MAP       --                Physical Exam    Nursing note and vitals reviewed.  Constitutional: Vital signs are normal. He appears well-nourished.   HENT:   Head: Normocephalic and atraumatic.   Eyes: Conjunctivae and EOM are normal.   Neck: Neck supple. No thyroid mass present.   Normal range of motion.  Cardiovascular: Normal rate, regular rhythm and normal heart sounds. Exam reveals no gallop and no friction rub.    No murmur heard.  Pulmonary/Chest: Breath sounds normal. He has no wheezes. He has no rhonchi. He has no rales.   Abdominal: Abdomen is soft. Bowel sounds are normal. There is no abdominal tenderness.   Musculoskeletal:      Cervical back: Normal range of motion and neck supple.      Comments: The patient has diffuse swelling with tenderness primarily over the dorsal midfoot and of the medial ankle.     Neurological: He is alert and oriented to person, place, and time. He has normal strength. No cranial nerve deficit or sensory deficit.   Skin: Skin is warm and dry. No rash noted. No erythema.   Patient has a superficial skin tear over the right medial malleolus.   Psychiatric: He has a normal mood and affect. His speech is normal. Cognition and memory are normal.         ED  Course   Procedures  Labs Reviewed - No data to display       Imaging Results          X-Ray Foot Complete Right (In process)                X-Ray Ankle Complete Right (In process)                  Medications   HYDROcodone-acetaminophen  mg per tablet 1 tablet (1 tablet Oral Given 7/29/22 0147)     Medical Decision Making:   History:   Old Medical Records: I decided to obtain old medical records.  Independently Interpreted Test(s):   I have ordered and independently interpreted X-rays - see prior notes.  Clinical Tests:   Radiological Study: Ordered and Reviewed  ED Management:  This patient was emergently assessed shortly after arrival.  Tetanus is up-to-date.  Vital signs are stable.  exam reveals right foot swelling without evidence of open fracture.  Radiographs of the foot demonstrate: Segmental fracture 4th metatarsal demonstrates a fracture at the level of the mid  diaphysis and also the base of the 4th metatarsal. A fracture at the base of the 2nd metatarsal is suspected however not confirmed. Tarsal bones demonstrate normal alignment per [facility]Rad.  I cannot see XR evidence of ankle fracture, no Achilles tendon rupture.  Good control of pain with [place] and he will be discharged with strong pain meds.  He is educated about supportive care including ice and elevation today.  He will be provided a postop shoe and crutches and asked to not bear weight.  Asked to follow up with orthopedic services as soon as possible or to return to the emergency department immediately for any new concerning or worsening symptoms including pain out of proportion or abnormal coloration or sensation of the foot.  Patient is agreeable with this plan and was discharged in stable condition with a .          Scribe Attestation:   Scribe #1: I performed the above scribed service and the documentation accurately describes the services I performed. I attest to the accuracy of the note.               I, Dr. iKngs Bautista,  personally performed the services described in this documentation. All medical record entries made by the scribe were at my direction and in my presence.  I have reviewed the chart and agree that the record reflects my personal performance and is accurate and complete. Kings Bautista MD.  5:54 AM 07/29/2022    Clinical Impression:   Final diagnoses:  [S99.929A] Foot injury  [S92.344A] Nondisplaced fracture of fourth metatarsal bone, right foot, initial encounter for closed fracture (Primary)  [S92.324A] Nondisplaced fracture of second metatarsal bone, right foot, initial encounter for closed fracture          ED Disposition Condition    Discharge Stable        ED Prescriptions     Medication Sig Dispense Start Date End Date Auth. Provider    HYDROcodone-acetaminophen (NORCO)  mg per tablet Take 1 tablet by mouth every 6 (six) hours as needed for Pain. 12 tablet 7/29/2022 8/1/2022 Kings Bautista MD        Follow-up Information     Follow up With Specialties Details Why Contact Info    Ghulam Wilcox II, MD Orthopedic Surgery Schedule an appointment as soon as possible for a visit   [address] [place] [address] [address] Monika SILVA [id number]  [phone]             Kings Bautista MD  07/29/22 0567

## 2022-07-29 NOTE — Clinical Note
"Ned Jonesperi Mcdermott was seen and treated in our emergency department on 7/29/2022.  He may return to work on 09/08/2022.       If you have any questions or concerns, please don't hesitate to call.      Kings Bautista MD"